# Patient Record
Sex: MALE | Race: WHITE | NOT HISPANIC OR LATINO | URBAN - METROPOLITAN AREA
[De-identification: names, ages, dates, MRNs, and addresses within clinical notes are randomized per-mention and may not be internally consistent; named-entity substitution may affect disease eponyms.]

---

## 2019-05-01 ENCOUNTER — HISTORICAL (OUTPATIENT)
Dept: ADMINISTRATIVE | Facility: HOSPITAL | Age: 19
End: 2019-05-01

## 2019-05-01 LAB
ABS NEUT (OLG): 5.1 X10(3)/MCL (ref 2.1–9.2)
ALBUMIN SERPL-MCNC: 4.5 GM/DL (ref 3.4–5)
ALBUMIN/GLOB SERPL: 1.3 RATIO (ref 1.1–2)
ALP SERPL-CCNC: 67 UNIT/L (ref 45–117)
ALT SERPL-CCNC: 48 UNIT/L (ref 12–78)
AST SERPL-CCNC: 17 UNIT/L (ref 15–37)
BASOPHILS # BLD AUTO: 0.09 X10(3)/MCL
BASOPHILS NFR BLD AUTO: 1 %
BILIRUB SERPL-MCNC: 0.4 MG/DL (ref 0.2–1)
BILIRUBIN DIRECT+TOT PNL SERPL-MCNC: 0.2 MG/DL
BILIRUBIN DIRECT+TOT PNL SERPL-MCNC: 0.2 MG/DL
BUN SERPL-MCNC: 17 MG/DL (ref 7–18)
CALCIUM SERPL-MCNC: 10 MG/DL (ref 8.5–10.1)
CHLORIDE SERPL-SCNC: 107 MMOL/L (ref 98–107)
CO2 SERPL-SCNC: 30 MMOL/L (ref 21–32)
CREAT SERPL-MCNC: 1 MG/DL (ref 0.6–1.3)
EOSINOPHIL # BLD AUTO: 0.85 X10(3)/MCL
EOSINOPHIL NFR BLD AUTO: 7 %
ERYTHROCYTE [DISTWIDTH] IN BLOOD BY AUTOMATED COUNT: 13.2 % (ref 11.5–14.5)
GLOBULIN SER-MCNC: 3.5 GM/ML (ref 2.3–3.5)
GLUCOSE SERPL-MCNC: 71 MG/DL (ref 74–106)
HCT VFR BLD AUTO: 47.5 % (ref 40–51)
HGB BLD-MCNC: 16.1 GM/DL (ref 13.5–17.5)
IMM GRANULOCYTES # BLD AUTO: 0.03 10*3/UL
IMM GRANULOCYTES NFR BLD AUTO: 0 %
LYMPHOCYTES # BLD AUTO: 4.55 X10(3)/MCL
LYMPHOCYTES NFR BLD AUTO: 40 % (ref 13–40)
MCH RBC QN AUTO: 30.6 PG (ref 26–34)
MCHC RBC AUTO-ENTMCNC: 33.9 GM/DL (ref 31–37)
MCV RBC AUTO: 90.1 FL (ref 80–100)
MONOCYTES # BLD AUTO: 0.79 X10(3)/MCL
MONOCYTES NFR BLD AUTO: 7 % (ref 0–10)
NEUTROPHILS # BLD AUTO: 5.1 X10(3)/MCL
NEUTROPHILS NFR BLD AUTO: 45 X10(3)/MCL
PLATELET # BLD AUTO: 287 X10(3)/MCL (ref 130–400)
PMV BLD AUTO: 10.4 FL (ref 7.4–10.4)
POTASSIUM SERPL-SCNC: 4.2 MMOL/L (ref 3.5–5.1)
PROT SERPL-MCNC: 8 GM/DL (ref 6.4–8.2)
RBC # BLD AUTO: 5.27 X10(6)/MCL (ref 4.5–5.9)
SODIUM SERPL-SCNC: 140 MMOL/L (ref 136–145)
WBC # SPEC AUTO: 11.4 X10(3)/MCL (ref 4.5–11)

## 2019-05-14 ENCOUNTER — HISTORICAL (OUTPATIENT)
Dept: RADIOLOGY | Facility: HOSPITAL | Age: 19
End: 2019-05-14

## 2019-09-03 ENCOUNTER — HISTORICAL (OUTPATIENT)
Dept: FAMILY MEDICINE | Facility: CLINIC | Age: 19
End: 2019-09-03

## 2019-09-03 LAB
APPEARANCE, UA: CLEAR
BACTERIA #/AREA URNS AUTO: ABNORMAL /[HPF]
BILIRUB UR QL STRIP: NEGATIVE
COLOR UR: YELLOW
CRP SERPL-MCNC: <0.3 MG/DL
ERYTHROCYTE [DISTWIDTH] IN BLOOD BY AUTOMATED COUNT: 12.7 % (ref 11.5–14.5)
GLUCOSE (UA): NORMAL
HCT VFR BLD AUTO: 52.8 % (ref 40–51)
HGB BLD-MCNC: 17.6 GM/DL (ref 13.5–17.5)
HGB UR QL STRIP: NEGATIVE
HIV 1+2 AB+HIV1 P24 AG SERPL QL IA: NONREACTIVE
HYALINE CASTS #/AREA URNS LPF: ABNORMAL /[LPF]
KETONES UR QL STRIP: NEGATIVE
LEUKOCYTE ESTERASE UR QL STRIP: NEGATIVE
MCH RBC QN AUTO: 30.4 PG (ref 26–34)
MCHC RBC AUTO-ENTMCNC: 33.3 GM/DL (ref 31–37)
MCV RBC AUTO: 91.2 FL (ref 80–100)
NITRITE UR QL STRIP: NEGATIVE
PH UR STRIP: 6 [PH] (ref 4.5–8)
PLATELET # BLD AUTO: 292 X10(3)/MCL (ref 130–400)
PMV BLD AUTO: 9.4 FL (ref 7.4–10.4)
PROT UR QL STRIP: 30 MG/DL
RBC # BLD AUTO: 5.79 X10(6)/MCL (ref 4.5–5.9)
RBC #/AREA URNS AUTO: ABNORMAL /[HPF]
SP GR UR STRIP: 1.04 (ref 1–1.03)
SQUAMOUS #/AREA URNS LPF: ABNORMAL /[LPF]
TSH SERPL-ACNC: 1.37 MIU/L (ref 0.36–3.74)
UROBILINOGEN UR STRIP-ACNC: 2 MG/DL
WBC # SPEC AUTO: 10 X10(3)/MCL (ref 4.5–11)
WBC #/AREA URNS AUTO: ABNORMAL /HPF

## 2022-04-10 ENCOUNTER — HISTORICAL (OUTPATIENT)
Dept: ADMINISTRATIVE | Facility: HOSPITAL | Age: 22
End: 2022-04-10

## 2022-04-30 VITALS
OXYGEN SATURATION: 100 % | HEIGHT: 64 IN | WEIGHT: 173.31 LBS | SYSTOLIC BLOOD PRESSURE: 120 MMHG | DIASTOLIC BLOOD PRESSURE: 68 MMHG | BODY MASS INDEX: 29.59 KG/M2

## 2022-05-30 ENCOUNTER — TELEPHONE (OUTPATIENT)
Dept: FAMILY MEDICINE | Facility: CLINIC | Age: 22
End: 2022-05-30

## 2022-05-30 NOTE — TELEPHONE ENCOUNTER
90L completed to the best of my ability considering I have never eval'd the patient face to face (I.e. PE not fully completed). Placed in tower for scan and fax.    Thanks,  ROMAN Murphy MD      ----- Message from Perla Fofana sent at 5/27/2022 11:20 AM CDT -----  Paulette good morning [facility] patient 90-L form that was scanned into the chart 5-. Needs to be filled out ASAP.

## 2022-10-27 ENCOUNTER — OFFICE VISIT (OUTPATIENT)
Dept: FAMILY MEDICINE | Facility: CLINIC | Age: 22
End: 2022-10-27
Payer: MEDICAID

## 2022-10-27 VITALS
TEMPERATURE: 98 F | BODY MASS INDEX: 33.29 KG/M2 | HEART RATE: 60 BPM | HEIGHT: 64 IN | WEIGHT: 195 LBS | RESPIRATION RATE: 17 BRPM | OXYGEN SATURATION: 100 % | SYSTOLIC BLOOD PRESSURE: 122 MMHG | DIASTOLIC BLOOD PRESSURE: 72 MMHG

## 2022-10-27 DIAGNOSIS — F91.3 OPPOSITIONAL DEFIANT DISORDER: ICD-10-CM

## 2022-10-27 DIAGNOSIS — K21.9 GASTROESOPHAGEAL REFLUX DISEASE, UNSPECIFIED WHETHER ESOPHAGITIS PRESENT: ICD-10-CM

## 2022-10-27 DIAGNOSIS — R44.3 HALLUCINATIONS: ICD-10-CM

## 2022-10-27 DIAGNOSIS — F84.0 AUTISM: ICD-10-CM

## 2022-10-27 LAB
ALBUMIN SERPL-MCNC: 4.7 GM/DL (ref 3.5–5)
ALBUMIN/GLOB SERPL: 1.6 RATIO (ref 1.1–2)
ALP SERPL-CCNC: 58 UNIT/L (ref 40–150)
ALT SERPL-CCNC: 64 UNIT/L (ref 0–55)
AST SERPL-CCNC: 24 UNIT/L (ref 5–34)
BASOPHILS # BLD AUTO: 0.07 X10(3)/MCL (ref 0–0.2)
BASOPHILS NFR BLD AUTO: 0.7 %
BILIRUBIN DIRECT+TOT PNL SERPL-MCNC: 0.7 MG/DL
BUN SERPL-MCNC: 15.4 MG/DL (ref 8.9–20.6)
CALCIUM SERPL-MCNC: 10.2 MG/DL (ref 8.4–10.2)
CHLORIDE SERPL-SCNC: 101 MMOL/L (ref 98–107)
CHOLEST SERPL-MCNC: 187 MG/DL
CHOLEST/HDLC SERPL: 6 {RATIO} (ref 0–5)
CO2 SERPL-SCNC: 26 MMOL/L (ref 22–29)
CREAT SERPL-MCNC: 1.02 MG/DL (ref 0.73–1.18)
EOSINOPHIL # BLD AUTO: 0.49 X10(3)/MCL (ref 0–0.9)
EOSINOPHIL NFR BLD AUTO: 5.1 %
ERYTHROCYTE [DISTWIDTH] IN BLOOD BY AUTOMATED COUNT: 13.2 % (ref 11.5–17)
EST. AVERAGE GLUCOSE BLD GHB EST-MCNC: 99.7 MG/DL
GFR SERPLBLD CREATININE-BSD FMLA CKD-EPI: >60 MLS/MIN/1.73/M2
GLOBULIN SER-MCNC: 3 GM/DL (ref 2.4–3.5)
GLUCOSE SERPL-MCNC: 67 MG/DL (ref 74–100)
HBA1C MFR BLD: 5.1 %
HCT VFR BLD AUTO: 50.5 % (ref 42–52)
HDLC SERPL-MCNC: 32 MG/DL (ref 35–60)
HGB BLD-MCNC: 16.6 GM/DL (ref 14–18)
IMM GRANULOCYTES # BLD AUTO: 0.02 X10(3)/MCL (ref 0–0.04)
IMM GRANULOCYTES NFR BLD AUTO: 0.2 %
LDLC SERPL CALC-MCNC: 108 MG/DL (ref 50–140)
LYMPHOCYTES # BLD AUTO: 3.17 X10(3)/MCL (ref 0.6–4.6)
LYMPHOCYTES NFR BLD AUTO: 33.1 %
MCH RBC QN AUTO: 29.9 PG (ref 27–31)
MCHC RBC AUTO-ENTMCNC: 32.9 MG/DL (ref 33–36)
MCV RBC AUTO: 90.8 FL (ref 80–94)
MONOCYTES # BLD AUTO: 0.57 X10(3)/MCL (ref 0.1–1.3)
MONOCYTES NFR BLD AUTO: 5.9 %
NEUTROPHILS # BLD AUTO: 5.3 X10(3)/MCL (ref 2.1–9.2)
NEUTROPHILS NFR BLD AUTO: 55 %
NRBC BLD AUTO-RTO: 0 %
PLATELET # BLD AUTO: 327 X10(3)/MCL (ref 130–400)
PMV BLD AUTO: 10 FL (ref 7.4–10.4)
POTASSIUM SERPL-SCNC: 4.3 MMOL/L (ref 3.5–5.1)
PROT SERPL-MCNC: 7.7 GM/DL (ref 6.4–8.3)
RBC # BLD AUTO: 5.56 X10(6)/MCL (ref 4.7–6.1)
SODIUM SERPL-SCNC: 139 MMOL/L (ref 136–145)
TRIGL SERPL-MCNC: 235 MG/DL (ref 34–140)
TSH SERPL-ACNC: 1.19 UIU/ML (ref 0.35–4.94)
VLDLC SERPL CALC-MCNC: 47 MG/DL
WBC # SPEC AUTO: 9.6 X10(3)/MCL (ref 4.5–11.5)

## 2022-10-27 PROCEDURE — 80061 LIPID PANEL: CPT

## 2022-10-27 PROCEDURE — 36415 COLL VENOUS BLD VENIPUNCTURE: CPT

## 2022-10-27 PROCEDURE — 85025 COMPLETE CBC W/AUTO DIFF WBC: CPT

## 2022-10-27 PROCEDURE — 80053 COMPREHEN METABOLIC PANEL: CPT

## 2022-10-27 PROCEDURE — 83036 HEMOGLOBIN GLYCOSYLATED A1C: CPT

## 2022-10-27 PROCEDURE — 84443 ASSAY THYROID STIM HORMONE: CPT

## 2022-10-27 PROCEDURE — 99213 OFFICE O/P EST LOW 20 MIN: CPT | Mod: PBBFAC
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
## 2022-11-08 ENCOUNTER — CLINICAL SUPPORT (OUTPATIENT)
Dept: FAMILY MEDICINE | Facility: CLINIC | Age: 22
End: 2022-11-08
Payer: MEDICAID

## 2022-11-08 DIAGNOSIS — R44.3 HALLUCINATIONS: Primary | ICD-10-CM

## 2022-11-08 NOTE — PROGRESS NOTES
Chief Complaint  Hallucinations (Daily and worsening) and Appointment (Possible virtual visit with accomodation for initial visit.)      The patient location is: [place]       Visit type: audiovisual    Face to Face time with patient: 10 mins  30 minutes of total time spent on the encounter, which includes face to face time and non-face to face time preparing to see the patient (eg, review of tests), Obtaining and/or reviewing separately obtained history, Documenting clinical information in the electronic or other health record, Independently interpreting results (not separately reported) and communicating results to the patient/family/caregiver, or Care coordination (not separately reported).         Each patient to whom he or she provides medical services by telemedicine is:  (1) informed of the relationship between the physician and patient and the respective role of any other health care provider with respect to management of the patient; and (2) notified that he or she may decline to receive medical services by telemedicine and may withdraw from such care at any time.    History of Present Illness  Rod Siddiqui Jr. is a 22 y.o. year old male with PMH of Autism (non-verbal), ODD, GERD presents to the clinic for hallucinations f/u; last seen 10/27/2022 labs drawn to r/o organic causes and referral to psych    Mother, Alicia Sanders, reports continued visual, auditory and tactile hallucinations. Occurs several times daily. Patient becoming more agitated. 2 days ago, patient saw a snake wrapped around his throat.Becomes agitated mother tries redirect Denies hallucinations telling him to harm himself or others. Mother doesn't fele unsafe or threatened, no history of physical abuse. No appt from Dr. Tiwari office yet. Mother inquiring if it's possible make a telehealth appointment with [facility] in the interim.      Review of Systems  ROS-as noted in HPI      Current Outpatient Medications  No current  outpatient medications on file as of 11/8/2022.     No current facility-administered medications on file as of 11/8/2022.         Assessment / Plan:    Hallucinations  Labs reviewed and discussed with Mother, no organic causes found  No SI/HI, no history of aggression or violence  Awaiting appointment from Dr. Prater's clinic. Will reach out to office to inquire on status of referral  ED precautions provided      Follow up:    In 4 weeks, or earlier if needed. Patient requires televisits only    Shira Mccrary MD  [employer] [facility] PGY-2

## 2022-11-09 NOTE — PROGRESS NOTES
Faculty Attestation: This is a telehealth visit that was performed with the originating site at patient's home in [place], [place] and the distant site through [facility] in [place], [place]. Verbal consent to participate in the video visit was obtained. This particular visit occurred during the 2020 COVID19 outbreak. The resident discussed with the patient the nature of this telehealth visit, that: there is an evaluation of the patient and recommendations for diagnostics and treatments based on this assessment; our sessions are not being recorded and that personal health information is protected as much as possible; our team would provide follow up care in person if/when the patient needs it. Rod Sididqui Jr.  was last seen in [facility]. Discussed with resident at the time of the visit. History of Present Illness, no physical exam, and Assessment and Plan reviewed.  Treatment plan is reasonable and appropriate. Compliance with treatment recommendations is important. No imaging studies were done today. - Tomas Huynh MD

## 2022-12-05 ENCOUNTER — PATIENT MESSAGE (OUTPATIENT)
Dept: BEHAVIORAL HEALTH | Facility: CLINIC | Age: 22
End: 2022-12-05
Payer: MEDICAID

## 2022-12-09 ENCOUNTER — TELEPHONE (OUTPATIENT)
Dept: FAMILY MEDICINE | Facility: CLINIC | Age: 22
End: 2022-12-09
Payer: MEDICAID

## 2022-12-09 NOTE — TELEPHONE ENCOUNTER
[facility]. Patient is nonverbal and his mother, Elayne, is his caregiver. GEETHAW spoke to Elayne on 12/9/22 regarding her concerns.    Patient was referred to Dr. Prater and has an appointment pending in January 2023, however, family has an alternate appointment scheduled at [facility] before the appointment with Dr. Prater. The patient's mother knows one of the psychiatric providers there and is requesting to establish psychiatric care with [facility] at this time. [facility] is requesting a referral with the most recent office note, labs, and facesheet. [facility]     LCSW will follow up with family next week. Elayne does not want to cancel the appointment with Dr. Prater until the appointment/referral with [facility] is secured. GEETHAW will follow up.   -----------------------  12/12/22- [facility] receipt of [facility] and clinicals. No further documentation requested at this time. [facility]'s mother, Elayne, to advise but there was no answer. LCSW left a VM encouraging Elayne to call with further unmet needs or concerns. LCSW will be available as needed.

## 2023-03-10 ENCOUNTER — TELEPHONE (OUTPATIENT)
Dept: FAMILY MEDICINE | Facility: CLINIC | Age: 23
End: 2023-03-10
Payer: MEDICAID

## 2023-03-24 ENCOUNTER — CLINICAL SUPPORT (OUTPATIENT)
Dept: FAMILY MEDICINE | Facility: CLINIC | Age: 23
End: 2023-03-24
Payer: MEDICAID

## 2023-03-24 DIAGNOSIS — F84.0 AUTISM: Primary | ICD-10-CM

## 2023-03-24 DIAGNOSIS — R44.3 HALLUCINATIONS: ICD-10-CM

## 2023-03-24 RX ORDER — OLANZAPINE 5 MG/1
TABLET ORAL
COMMUNITY
Start: 2023-02-07 | End: 2023-05-25

## 2023-03-24 RX ORDER — OLANZAPINE 5 MG/1
5 TABLET ORAL NIGHTLY
COMMUNITY
Start: 2023-03-14 | End: 2023-05-25

## 2023-03-24 RX ORDER — OXCARBAZEPINE 300 MG/1
300 TABLET, FILM COATED ORAL 2 TIMES DAILY
COMMUNITY
Start: 2023-03-14 | End: 2023-07-06 | Stop reason: SDUPTHER

## 2023-03-24 RX ORDER — ALPRAZOLAM 0.5 MG/1
0.5 TABLET ORAL 2 TIMES DAILY PRN
COMMUNITY
Start: 2023-03-14 | End: 2024-03-27 | Stop reason: ALTCHOICE

## 2023-03-24 NOTE — PROGRESS NOTES
Faculty addendum: Patient discussed with resident.  Care provided reasonable and necessary. I participated in the management of the patient and was immediately available throughout the encounter. Services were furnished in a primary care center located in the outpatient department of a [facility]. I agree with the resident's findings and plan as documented in the resident's note.     Tele visit with mom, patient non verbal autistic patient. Recent medication changes with psychiatric NP, has appointment with Dr. Prater in May 2023. 16min total time.    Lona Harris DO

## 2023-03-24 NOTE — PROGRESS NOTES
Chief Complaint  Hallucinations (Follow up )      Audio Only Telehealth Visit     The patient location is: [place]  Visit type: Virtual visit with audio only (telephone)  Total time spent with patient: 16mins     The reason for the audio only service rather than synchronous audio and video virtual visit was related to technical difficulties or patient preference/necessity.     Each patient to whom I provide medical services by telemedicine is:  (1) informed of the relationship between the physician and patient and the respective role of any other health care provider with respect to management of the patient; and (2) notified that they may decline to receive medical services by telemedicine and may withdraw from such care at any time. Patient caregiver verbally consented to receive this service via voice-only telephone call.      History of Present Illness  Rod Siddiqui Jr. is a 22-year-old male with a past medical history of autism (nonverbal), ODT, GERD, hallucinations; Mother scheduled a telemed visit for hallucinations follow up; last visit 11/08/2022     Patient is nonverbal and mother/caretaker, Alicia Sanders, presents for follow up on behalf on patient.    Patient was unable to get an earlier appointment with Dr. Prater, in interm was scheduled to be seen by [facility].   Provider was Cole Meza NP last seen 3/14/2023, next visit pending   5/25/2023  Initially started Risperdal 1mg qam Risperdal 0.5mg qhs with Zyprexa 2.5mg qhs.  Symptoms persisted, then transitioned to Zyprexa 5mg bid.  Prescribed Xanax and Trileptal, mother not given due to concern of polypharmacy.  Mother pleased with results and reports symptoms are much better controlled.  Rod is less agitation, not overly sedated, no longer crying daily or appears to be in mental distress.  Continues to have auditory/tactile/visual hallucinations 1-2 a day; previously consistently 24hr/day  No self harm, SI/HI   Increased weight  gain, mother unable to quantify      ROS per HPI      Current Outpatient Medications  Current Outpatient Medications   Medication Instructions    ALPRAZolam (XANAX) 0.5 mg, Oral, 2 times daily PRN    OLANZapine (ZYPREXA) 5 MG tablet 1.5 tablest    OLANZapine (ZYPREXA) 5 mg, Oral, Nightly    OXcarbazepine (TRILEPTAL) 300 mg, Oral, 2 times daily             Assessment / Plan:    Autism-nonverbal  Hallucinations    Concern for schizophrenia  Psychiatric evaluation pending, scheduled for 05/25/2023  Keep appointment with Dr. Prater for complete assessment and medication reconciliation.  Keep follow up with mental health provider  Cautioned on side effects of Zyprexa  Recommended dietary modification and exercise  Discussed with mental health provider on surveillance labs to include but not limited to lipid panel.  ED precautions given    Start time: 1105  End time: 1121    This service was not originating from a related E/M service provided within the previous 7 days nor will  to an E/M service or procedure within the next 24 hours or my soonest available appointment.  Prevailing standard of care was able to be met in this audio-only visit.      Follow up:    In 3 months, or earlier if needed.     Shira Mccrary MD  [employer] [facility] PGY-2

## 2023-05-25 ENCOUNTER — OFFICE VISIT (OUTPATIENT)
Dept: BEHAVIORAL HEALTH | Facility: CLINIC | Age: 23
End: 2023-05-25
Payer: MEDICAID

## 2023-05-25 VITALS
HEART RATE: 62 BPM | TEMPERATURE: 98 F | BODY MASS INDEX: 35.06 KG/M2 | DIASTOLIC BLOOD PRESSURE: 74 MMHG | SYSTOLIC BLOOD PRESSURE: 124 MMHG | OXYGEN SATURATION: 100 % | WEIGHT: 204.13 LBS

## 2023-05-25 DIAGNOSIS — F29 PSYCHOSIS, UNSPECIFIED PSYCHOSIS TYPE: Primary | ICD-10-CM

## 2023-05-25 PROCEDURE — 1159F MED LIST DOCD IN RCRD: CPT | Mod: CPTII,,, | Performed by: STUDENT IN AN ORGANIZED HEALTH CARE EDUCATION/TRAINING PROGRAM

## 2023-05-25 PROCEDURE — 3074F SYST BP LT 130 MM HG: CPT | Mod: CPTII,,, | Performed by: STUDENT IN AN ORGANIZED HEALTH CARE EDUCATION/TRAINING PROGRAM

## 2023-05-25 PROCEDURE — 3078F PR MOST RECENT DIASTOLIC BLOOD PRESSURE < 80 MM HG: ICD-10-PCS | Mod: CPTII,,, | Performed by: STUDENT IN AN ORGANIZED HEALTH CARE EDUCATION/TRAINING PROGRAM

## 2023-05-25 PROCEDURE — 3008F BODY MASS INDEX DOCD: CPT | Mod: CPTII,,, | Performed by: STUDENT IN AN ORGANIZED HEALTH CARE EDUCATION/TRAINING PROGRAM

## 2023-05-25 PROCEDURE — 3008F PR BODY MASS INDEX (BMI) DOCUMENTED: ICD-10-PCS | Mod: CPTII,,, | Performed by: STUDENT IN AN ORGANIZED HEALTH CARE EDUCATION/TRAINING PROGRAM

## 2023-05-25 PROCEDURE — 99204 OFFICE O/P NEW MOD 45 MIN: CPT | Mod: AF,HB,S$PBB, | Performed by: STUDENT IN AN ORGANIZED HEALTH CARE EDUCATION/TRAINING PROGRAM

## 2023-05-25 PROCEDURE — 3078F DIAST BP <80 MM HG: CPT | Mod: CPTII,,, | Performed by: STUDENT IN AN ORGANIZED HEALTH CARE EDUCATION/TRAINING PROGRAM

## 2023-05-25 PROCEDURE — 99213 OFFICE O/P EST LOW 20 MIN: CPT | Mod: PBBFAC,PN | Performed by: STUDENT IN AN ORGANIZED HEALTH CARE EDUCATION/TRAINING PROGRAM

## 2023-05-25 PROCEDURE — 1159F PR MEDICATION LIST DOCUMENTED IN MEDICAL RECORD: ICD-10-PCS | Mod: CPTII,,, | Performed by: STUDENT IN AN ORGANIZED HEALTH CARE EDUCATION/TRAINING PROGRAM

## 2023-05-25 PROCEDURE — 3074F PR MOST RECENT SYSTOLIC BLOOD PRESSURE < 130 MM HG: ICD-10-PCS | Mod: CPTII,,, | Performed by: STUDENT IN AN ORGANIZED HEALTH CARE EDUCATION/TRAINING PROGRAM

## 2023-05-25 PROCEDURE — 99204 PR OFFICE/OUTPT VISIT, NEW, LEVL IV, 45-59 MIN: ICD-10-PCS | Mod: AF,HB,S$PBB, | Performed by: STUDENT IN AN ORGANIZED HEALTH CARE EDUCATION/TRAINING PROGRAM

## 2023-05-25 PROCEDURE — 1160F RVW MEDS BY RX/DR IN RCRD: CPT | Mod: CPTII,,, | Performed by: STUDENT IN AN ORGANIZED HEALTH CARE EDUCATION/TRAINING PROGRAM

## 2023-05-25 PROCEDURE — 1160F PR REVIEW ALL MEDS BY PRESCRIBER/CLIN PHARMACIST DOCUMENTED: ICD-10-PCS | Mod: CPTII,,, | Performed by: STUDENT IN AN ORGANIZED HEALTH CARE EDUCATION/TRAINING PROGRAM

## 2023-05-25 RX ORDER — OLANZAPINE AND SAMIDORPHAN L-MALATE 15; 10 MG/1; MG/1
15 TABLET, FILM COATED ORAL DAILY
COMMUNITY
End: 2023-05-30

## 2023-05-25 RX ORDER — OLANZAPINE 10 MG/1
10 TABLET ORAL NIGHTLY
COMMUNITY
Start: 2023-04-24 | End: 2023-05-25 | Stop reason: DRUGHIGH

## 2023-05-25 RX ORDER — ARIPIPRAZOLE 5 MG/1
15 TABLET ORAL DAILY
Qty: 30 TABLET | Refills: 2 | Status: SHIPPED | OUTPATIENT
Start: 2023-05-25 | End: 2023-06-08 | Stop reason: ALTCHOICE

## 2023-05-25 RX ORDER — OLANZAPINE 5 MG/1
TABLET ORAL
Qty: 90 TABLET | Refills: 5 | Status: SHIPPED | OUTPATIENT
Start: 2023-05-25 | End: 2023-07-06 | Stop reason: SDUPTHER

## 2023-05-25 NOTE — PROGRESS NOTES
"Outpatient Psychiatry Initial Visit    5/25/2023    Rod Siddiqui Jr., a 22 y.o. male, presenting for initial evaluation visit. Met with patient.    Reason for Encounter:   Referred from: Buddy Martin MD  Reason for referral: "Autism," "hallucinations"  Chief complaint: hallucinations, behavioral problem x 6 months    History of Present Illness:   Pt is a 21yo M w/ PPHx of autism  who presents to psychiatry clinic for evaluation.  Pt mostly nonverbal, history provided by pt's mother.     Pt notes history of mental health treatment, last seen about 1-2 wks ago.  Was seeing [facility] in [place], [place] (Kate Meza, NP).  Transferring care due to travel issues, and lack of contact with MH provider.  Pt has been in mental health for about 6 months.  Notes onset of hallucinations (AH and VH) about 6 months ago.  Mother initially attributes to due to watching particular videos, notes changing device settings to children's settings.  Met with provider, diagnosed with schizophrenia.  Has been tried on zyprexa (somewhat helpful, still have episodes), risperidone (no benefit) and geodon (no benefit).  Now taking zyprexa 10mg qam + 15mg nightly.  Recently changed to zyprexa+ samidorphan.  Also taking trileptal, notes benefit for irritability.  Current problem is daytime oversedation.  Also diagnosed with ODD and autism (diagnosed with autism).  Currently receiving OCDD services (has , sitter 6am-4pm M-F).      Notes hallucinations are seeing and hearing male and female people.  Notes VH are of people "touching" patient and kissing patient on face and neck.  No history of abuse reported.  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  Pt mostly nonverbal, gestures to indicate his thoughts/emotions.  Graduated from school with special ED, had IEP.  Spends most of time with family, has few friends.  Notes, prior to outbursts, says his name repeatedly.  Denies self injury when stressed.  "     Meds Hx (has pt taken the following):   SSRIs: denies  SNRIs: denies  TCAs: denies  Atypical ADs: denies  Anxiolytics: xanax (helpful, SE sedation)  Neuroleptics: geodon (not helpful, no SE), risperidone (not helpful), zyprexa (some benefit, SE sedation and increased appetite)  Mood stabilizers: denies  Stimulants: denies  Other: trileptal (helpful, no SE)    History:     Allergies:  Augmentin [amoxicillin-pot clavulanate]    Past Medical/Surgical History:  Past Medical History:   Diagnosis Date    Autism 10/27/2022    GERD (gastroesophageal reflux disease) 10/27/2022    Oppositional defiant disorder 10/27/2022     History reviewed. No pertinent surgical history.    Medications  Outpatient Encounter Medications as of 5/25/2023   Medication Sig Dispense Refill    ALPRAZolam (XANAX) 0.5 MG tablet Take 0.5 mg by mouth 2 (two) times daily as needed.      OLANZapine-samidorphan (LYBALVI) 15-10 mg Tab Take 15 mg by mouth once daily.      OXcarbazepine (TRILEPTAL) 300 MG Tab Take 300 mg by mouth 2 (two) times daily.      [DISCONTINUED] OLANZapine (ZYPREXA) 10 MG tablet Take 10 mg by mouth every evening.      [DISCONTINUED] OLANZapine (ZYPREXA) 5 MG tablet 1.5 tablest      ARIPiprazole (ABILIFY) 5 MG Tab Take 3 tablets (15 mg total) by mouth once daily. 30 tablet 2    OLANZapine (ZYPREXA) 5 MG tablet Take olanzapine 5mg daily and 10mg nightly. 90 tablet 5    [DISCONTINUED] OLANZapine (ZYPREXA) 5 MG tablet Take 5 mg by mouth every evening.       No facility-administered encounter medications on file as of 5/25/2023.     Past Psychiatric History:  Previous Medication Trials: See above   Previous Psychiatric Hospitalizations: denies   Previous Suicide Attempts: denies   History of Violence: None in past 6 months  Outpatient mental health: seeing MH provider in [place]  Family History: denies    Social History:  Marital Status: denies  Children:    Employment Status/Info: not working  Education: HS grad, had  IEP  Housing Status: lives in [place] [place] with mother, brother, sister, grandchild, and sister's partner  History of phys/sexual abuse: denies  Access to gun: yes, in locked/secure location, pt has no access    Substance Abuse History:  Tobacco Use: 1ppd, started 15yo  Use of Alcohol: denies  Recreational Drugs: denies  Rehab/detox: denies    Legal History:  Past Charges/Incarcerations: denies   Pending charges: denies     Psychosocial Stressors: health    Review Of Systems:     OSCAR 2/2 current mentation    Current Evaluation:     Nutritional Screening: Considering the patient's height and weight, medications, medical history and preferences, should a referral be made to the dietitian? no    Constitutional  Vitals:  Most recent vital signs, dated less than 90 days prior to this appointment, were reviewed.      Vitals:    05/25/23 0925   BP: 124/74   Pulse: 62   Temp: 98 °F (36.7 °C)   SpO2: 100%   Weight: 92.6 kg (204 lb 1.6 oz)      General:  No acute distress     Neurologic:   Motor: moves all extremities spontaneously and without difficulty  Gait: normal gait and station    Mental status examination:  Appearance: unremarkable, age appropriate, bearded, overweight  Level of Consciousness: awake and alert  Behavior/Cooperation:  pacing about room, +echopraxia  Psychomotor: psychomotor agitation (pacing)  Speech: dysarthia, loud, monotone  Language: english, fluid  Orientation: OSCAR  Attention Span/Concentration: does not attend to interview  Memory: [facility]  Mood: [facility]  Affect: mood congruent and blunted  Thought Process: OSCAR  Associations: [facility]  Thought Content: OSCAR  Fund of Knowledge: [facility]  Abstraction: [facility]  Insight: [facility]  Judgment: [facility]    Relevant Elements of Neurological Exam: no abnormal involuntary movements observed    Functioning in Relationships:  Spouse/partner: not in dating relationship  Peers: few friends  Employers: not working currently    Assessments:   PHQ9:   PHQ9 5/25/2023   Total Score 8     GAD7:   GAD7  5/25/2023   1. Feeling nervous, anxious, or on edge? 3   2. Not being able to stop or control worrying? 0   3. Worrying too much about different things? 0   4. Trouble relaxing? 1   5. Being so restless that it is hard to sit still? 2   6. Becoming easily annoyed or irritable? 2   7. Feeling afraid as if something awful might happen? 2   8. If you checked off any problems, how difficult have these problems made it for you to do your work, take care of things at home, or get along with other people? 1   OLVIN-7 Score 10     Laboratory Data  No visits with results within 1 Month(s) from this visit.   Latest known visit with results is:   Office Visit on 10/27/2022   Component Date Value Ref Range Status    Hemoglobin A1c 10/27/2022 5.1  <=7.0 % Final    Estimated Average Glucose 10/27/2022 99.7  mg/dL Final    Cholesterol Total 10/27/2022 187  <=200 mg/dL Final    HDL Cholesterol 10/27/2022 32 (L)  35 - 60 mg/dL Final    Triglyceride 10/27/2022 235 (H)  34 - 140 mg/dL Final    Cholesterol/HDL Ratio 10/27/2022 6 (H)  0 - 5 Final    Very Low Density Lipoprotein 10/27/2022 47   Final    LDL Cholesterol 10/27/2022 108.00  50.00 - 140.00 mg/dL Final    Sodium Level 10/27/2022 139  136 - 145 mmol/L Final    Potassium Level 10/27/2022 4.3  3.5 - 5.1 mmol/L Final    Chloride 10/27/2022 101  98 - 107 mmol/L Final    Carbon Dioxide 10/27/2022 26  22 - 29 mmol/L Final    Glucose Level 10/27/2022 67 (L)  74 - 100 mg/dL Final    Blood Urea Nitrogen 10/27/2022 15.4  8.9 - 20.6 mg/dL Final    Creatinine 10/27/2022 1.02  0.73 - 1.18 mg/dL Final    Calcium Level Total 10/27/2022 10.2  8.4 - 10.2 mg/dL Final    Protein Total 10/27/2022 7.7  6.4 - 8.3 gm/dL Final    Albumin Level 10/27/2022 4.7  3.5 - 5.0 gm/dL Final    Globulin 10/27/2022 3.0  2.4 - 3.5 gm/dL Final    Albumin/Globulin Ratio 10/27/2022 1.6  1.1 - 2.0 ratio Final    Bilirubin Total 10/27/2022 0.7  <=1.5 mg/dL Final    Alkaline Phosphatase 10/27/2022 58  40 - 150 unit/L  Final    Alanine Aminotransferase 10/27/2022 64 (H)  0 - 55 unit/L Final    Aspartate Aminotransferase 10/27/2022 24  5 - 34 unit/L Final    eGFR 10/27/2022 >60  mls/min/1.73/m2 Final    Thyroid Stimulating Hormone 10/27/2022 1.1858  0.3500 - 4.9400 uIU/mL Final    WBC 10/27/2022 9.6  4.5 - 11.5 x10(3)/mcL Final    RBC 10/27/2022 5.56  4.70 - 6.10 x10(6)/mcL Final    Hgb 10/27/2022 16.6  14.0 - 18.0 gm/dL Final    Hct 10/27/2022 50.5  42.0 - 52.0 % Final    MCV 10/27/2022 90.8  80.0 - 94.0 fL Final    MCH 10/27/2022 29.9  27.0 - 31.0 pg Final    MCHC 10/27/2022 32.9 (L)  33.0 - 36.0 mg/dL Final    RDW 10/27/2022 13.2  11.5 - 17.0 % Final    Platelet 10/27/2022 327  130 - 400 x10(3)/mcL Final    MPV 10/27/2022 10.0  7.4 - 10.4 fL Final    Neut % 10/27/2022 55.0  % Final    Lymph % 10/27/2022 33.1  % Final    Mono % 10/27/2022 5.9  % Final    Eos % 10/27/2022 5.1  % Final    Basophil % 10/27/2022 0.7  % Final    Lymph # 10/27/2022 3.17  0.6 - 4.6 x10(3)/mcL Final    Neut # 10/27/2022 5.3  2.1 - 9.2 x10(3)/mcL Final    Mono # 10/27/2022 0.57  0.1 - 1.3 x10(3)/mcL Final    Eos # 10/27/2022 0.49  0 - 0.9 x10(3)/mcL Final    Baso # 10/27/2022 0.07  0 - 0.2 x10(3)/mcL Final    IG# 10/27/2022 0.02  0 - 0.04 x10(3)/mcL Final    IG% 10/27/2022 0.2  % Final    NRBC% 10/27/2022 0.0  % Final       Assessment - Diagnosis - Goals:     Rod Siddiqui Jr., a 22 y.o. male, presenting for initial evaluation visit.     Impression:       ICD-10-CM ICD-9-CM   1. Psychosis, unspecified psychosis type  F29 298.9       Strengths and Liabilities: Strength: Patient is physically healthy., Strength: Patient has positive support network., Liability: Patient has intellectual deficits.    Treatment Goals:  Specify outcomes written in observable, behavioral terms:   Hallucinations: decrease frequency and intensity of hallucinations    Treatment Plan/Recommendations:   Pt's mother voices desire to change from Libalvi to alternative due to SE  (sedation, increased appetite)  Will corss titration zyprexa to aripiprazole  Decrease to zyprexa 5mg qam +10mg nightly, start abilify 15mg daily (goal dose 20-30mg daily)  If abilify effective, consider FERRARA  Continue trileptal 300mg bid  Continue xanax 0.5mg daily prn anxiety  Recent labwork in EMR reviewed, CBc wnl, CMP w/ borderline transaminitis, TSH wnl, FLP w/ elevated TGL, hba1c wnl  No need for PEC as pt is not an imminent danger to self or others or gravely disabled due to acute psychiatric illness  Discussed that pt should either call clinic for psychiatric crisis symptoms or present to nearest emergency room    Discussed with patient informed consent including diagnosis, risks and benefits of proposed treatment above vs. alternative treatments vs. no treatment, as well as serious and common side effects of these treatments, and the inherent unpredictability of individual responses to these treatments. The patient expresses understanding of the above and displays the capacity to agree with this current plan. Patient also agrees that, currently, the benefits outweigh the risks and would like to pursue treatment at this time, and had no other questions.    Instructions:  Take all medications as prescribed.    Abstain from recreational drugs and alcohol.  Present to ED or call 911 for SI/HI plan or intent, psychosis, or medical emergency.    Return to Clinic: Follow up in about 2 weeks (around 6/8/2023) for Virtual Visit.  Visits by telemedicine in the future.     Total time:   Complexity (level) of medical decision making employed in the encounter: HIGH    The total time for services performed on the date of the encounter (including review of prior visit notes, review of notes from other providers, review of results from laboratory/imaging studies, face-to-face time with patient, and time spent on other activities directly related to patient care): 50 minutes.    Manny Prater MD  [facility]  [facility]inic

## 2023-05-30 ENCOUNTER — OFFICE VISIT (OUTPATIENT)
Dept: FAMILY MEDICINE | Facility: CLINIC | Age: 23
End: 2023-05-30
Payer: MEDICAID

## 2023-05-30 VITALS
HEART RATE: 109 BPM | SYSTOLIC BLOOD PRESSURE: 108 MMHG | BODY MASS INDEX: 35 KG/M2 | HEIGHT: 64 IN | OXYGEN SATURATION: 96 % | TEMPERATURE: 99 F | DIASTOLIC BLOOD PRESSURE: 69 MMHG | WEIGHT: 205 LBS | RESPIRATION RATE: 20 BRPM

## 2023-05-30 DIAGNOSIS — F84.0 AUTISM: Primary | ICD-10-CM

## 2023-05-30 DIAGNOSIS — Z02.9 ENCOUNTER FOR ADMINISTRATIVE EXAMINATIONS: ICD-10-CM

## 2023-05-30 PROCEDURE — 99213 OFFICE O/P EST LOW 20 MIN: CPT | Mod: PBBFAC

## 2023-05-30 NOTE — PROGRESS NOTES
Chief Complaint  paperwork      History of Present Illness  Rod Siddiqui Jr. is a 22 y.o.  male presents to the clinic accompanied by Mother for 90-L    No acute complaints. Had difficulty getting patient to visit, wouldn't get out of car upon arrival, required some persuasion. Otherwise Mother states he's doing well    Saw Dr. Prater last week, they're happy with him and found beneficial; weaning Zyprexa now 5mg in am 10mg qhs on Trileptal 300mg bid, Abilify 15mg daily and alprazolam 0.5mg prn    Has a sitter [id number] while Mother at work    Review of Systems   Constitutional:  Negative for chills, fever and weight loss.   Respiratory:  Negative for cough and shortness of breath.    Cardiovascular:  Negative for chest pain.   Gastrointestinal:  Negative for abdominal pain, blood in stool, diarrhea, nausea and vomiting.   Genitourinary:  Negative for hematuria.     ROS obtained from Mother due to Medical condition      Physical Exam    Vitals:    05/30/23 1639   BP: 108/69   Pulse: 109   Resp: 20   Temp: 98.8 °F (37.1 °C)     Wt Readings from Last 2 Encounters:   05/30/23 93 kg (205 lb)   05/25/23 92.6 kg (204 lb 1.6 oz)     Gen: NAD; Nonverbal, makes eye contact, smiles when spoken to, looks to Mother frequently, Obese  HEENT: NC/AT, MMM, Oropharynx clear without erythema/exudates  Pulm: Nonlabored, CTABL  CV: RRR, no MRG  Abd: protuberant, soft, NT, ND, Normoactive BS  MSK: no gait abnormalities    Exam limited by patient cooperation      Current Outpatient Medications  Current Outpatient Medications   Medication Instructions    ALPRAZolam (XANAX) 0.5 mg, Oral, 2 times daily PRN    ARIPiprazole (ABILIFY) 15 mg, Oral, Daily    OLANZapine (ZYPREXA) 5 MG tablet Take olanzapine 5mg daily and 10mg nightly.    OXcarbazepine (TRILEPTAL) 300 mg, Oral, 2 times daily             Assessment / Plan:    Autism  Encounter for administrative examinations  90-L completed and scanned into chart  Continue current management  Keep  follow appts with Dr. Prater  Mother requesting telehealth visits   Consider HH as F2F visit are difficult for patient         Follow up:    In 3 months, or earlier if needed.     Shira Mccrary MD  [employer] [facility] PGY-2

## 2023-05-31 NOTE — PROGRESS NOTES
Faculty Attestation: Rod Siddiqui Jr.  was seen in Family Medicine Clinic. Discussed with resident at the time of the visit. History of Present Illness, Physical Exam, and Assessment and Plan reviewed. Treatment plan is reasonable and appropriate. Compliance with treatment recommendations is important.       Tomas Huynh MD

## 2023-06-08 ENCOUNTER — OFFICE VISIT (OUTPATIENT)
Dept: BEHAVIORAL HEALTH | Facility: CLINIC | Age: 23
End: 2023-06-08
Payer: MEDICAID

## 2023-06-08 DIAGNOSIS — F91.3 OPPOSITIONAL DEFIANT DISORDER: ICD-10-CM

## 2023-06-08 DIAGNOSIS — F29 PSYCHOSIS, UNSPECIFIED PSYCHOSIS TYPE: ICD-10-CM

## 2023-06-08 DIAGNOSIS — R44.3 HALLUCINATIONS: Primary | ICD-10-CM

## 2023-06-08 DIAGNOSIS — F84.0 AUTISM: ICD-10-CM

## 2023-06-08 PROCEDURE — 1160F PR REVIEW ALL MEDS BY PRESCRIBER/CLIN PHARMACIST DOCUMENTED: ICD-10-PCS | Mod: CPTII,95,, | Performed by: STUDENT IN AN ORGANIZED HEALTH CARE EDUCATION/TRAINING PROGRAM

## 2023-06-08 PROCEDURE — 99214 PR OFFICE/OUTPT VISIT, EST, LEVL IV, 30-39 MIN: ICD-10-PCS | Mod: 95,AF,HB, | Performed by: STUDENT IN AN ORGANIZED HEALTH CARE EDUCATION/TRAINING PROGRAM

## 2023-06-08 PROCEDURE — 1160F RVW MEDS BY RX/DR IN RCRD: CPT | Mod: CPTII,95,, | Performed by: STUDENT IN AN ORGANIZED HEALTH CARE EDUCATION/TRAINING PROGRAM

## 2023-06-08 PROCEDURE — 1159F MED LIST DOCD IN RCRD: CPT | Mod: CPTII,95,, | Performed by: STUDENT IN AN ORGANIZED HEALTH CARE EDUCATION/TRAINING PROGRAM

## 2023-06-08 PROCEDURE — 1159F PR MEDICATION LIST DOCUMENTED IN MEDICAL RECORD: ICD-10-PCS | Mod: CPTII,95,, | Performed by: STUDENT IN AN ORGANIZED HEALTH CARE EDUCATION/TRAINING PROGRAM

## 2023-06-08 PROCEDURE — 99214 OFFICE O/P EST MOD 30 MIN: CPT | Mod: 95,AF,HB, | Performed by: STUDENT IN AN ORGANIZED HEALTH CARE EDUCATION/TRAINING PROGRAM

## 2023-06-08 RX ORDER — CHLORPROMAZINE HYDROCHLORIDE 25 MG/1
25 TABLET, FILM COATED ORAL 2 TIMES DAILY
Qty: 60 TABLET | Refills: 5 | Status: SHIPPED | OUTPATIENT
Start: 2023-06-08 | End: 2023-06-14 | Stop reason: SDUPTHER

## 2023-06-08 NOTE — PROGRESS NOTES
"Outpatient Psychiatry Follow-Up Visit    6/8/2023    Clinical Status of Patient:  Outpatient (Ambulatory)    Chief Complaint:  Rod Siddiqui Jr. is a 22 y.o. male who presents today for follow-up of autism, ODD. Patient last seen for initial evaluation on 5/25/2023. Met with patient.      Interval History and Content of Current Session:  Interim Events/Subjective Report/Content of Current Session:   Pt reports doing "not good"  overall.  Increased episodes of yelling/arguing ("with the air"), continued hallucinations.  Sleeping "up and down," worsening.  Appetite "ok."  Energy decreased.  Endorses irritability.  Denies SI/HI/AVH/paranoia, denies plan or desire for self harm or harm to othe rs.  Denies SE from current regimen Denies somatic complaints.   Pt happy with current regimen and wants to continue.     Review of Systems   PSYCHIATRIC: Pertinant items are noted in the narrative.  CONSTITUTIONAL: No weight gain or loss.  MUSCULOSKELETAL: No pain or stiffness of the joints.  NEUROLOGIC: No weakness, sensory changes, seizures, confusion, memory loss, tremor or other abnormal movements.  CARDIAC: No CP, no palpitations  RESPIRATORY: No shortness of breath.  CARDIOVASCULAR: No tachycardia or chest pain.  GASTROINTESTINAL: No nausea, vomiting, pain, constipation or diarrhea.    Past Medical, Family and Social History: The patient's past medical, family and social history have been reviewed and updated as appropriate within the electronic medical record - see encounter notes.    Compliance: good    Side effects: denies    Risk Parameters:  Patient reports no suicidal ideation  Patient reports no homicidal ideation  Patient reports no self-injurious behavior  Patient reports no violent behavior    Exam (detailed: at least 9 elements; comprehensive: all 15 elements)   Constitutional  Vitals:  Most recent vital signs, dated less than 90 days prior to this appointment, were reviewed.     OSCAR 2/2 virtual visit.   "     General:   Constitutional: No acute distress, appears stated age, casually dressed    Neurologic:   Motor: moves all extremities spontaneously and without difficulty, no abnormal involuntary movements observed  Gait: normal gait and station    Mental status examination:   Appearance: appears stated age, casually dressed, no acute distress  Behavior: non verbal, standing and watching mother's conversation with provider  Mood: OSCAR  Affect: blunted  Thought process: non verbal  Associations: OSCAR  Thought content: OSCAR  Perceptions: OSCAR  Orientation: OSCAR  Language: no speech  Attention: OSCAR  Insight: OSCAR  Judgement: OSCAR    PHQ9 5/25/2023   Total Score 8     GAD7 5/25/2023   1. Feeling nervous, anxious, or on edge? 3   2. Not being able to stop or control worrying? 0   3. Worrying too much about different things? 0   4. Trouble relaxing? 1   5. Being so restless that it is hard to sit still? 2   6. Becoming easily annoyed or irritable? 2   7. Feeling afraid as if something awful might happen? 2   8. If you checked off any problems, how difficult have these problems made it for you to do your work, take care of things at home, or get along with other people? 1   OLVIN-7 Score 10     Assessment and Diagnosis   Status/Progress: Based on the examination today, the patient's problem(s) is/are inadequately controlled.  New problems have been presented today (SE from medication).   Co-morbidities and Lack of compliance are not complicating management of the primary condition.  Number of separate conditions addressed during today's visit: 2 (behavior unchanged, psychosis unchanged).  Are medication adjustments being made today: yes.  Are referral(s) being ordered today: no.  Complexity (level) of medical decision making employed in the encounter: high.    General Impression:    ICD-10-CM ICD-9-CM   1. Hallucinations  R44.3 780.1   2. Autism  F84.0 299.00   3. Oppositional defiant disorder  F91.3 313.81   4. Psychosis,  unspecified psychosis type  F29 298.9     Intervention/Counseling/Treatment Plan   Continue zyprexa 5mg qam +10mg nightly  Discontinue abilify due to lack of benefit  Start thorazine 25mg bid, Discussed potential SE including but not limited to sedation, metabolic disturbance, weight gain, movement disorder (including TD)  Continue trileptal 300mg bid  Continue xanax 0.5mg daily prn anxiety (none needed since last visit)  Recent labwork in EMR reviewed, CBc wnl, CMP w/ borderline transaminitis, TSH wnl, FLP w/ elevated TGL, hba1c wnl  No need for PEC as pt is not an imminent danger to self or others or gravely disabled due to acute psychiatric illness  Discussed that pt should either call clinic for psychiatric crisis symptoms or present to nearest emergency room    Discussed with patient informed consent including diagnosis, risks and benefits of proposed treatment above vs. alternative treatments vs. no treatment, as well as serious and common side effects of these treatments, and the inherent unpredictability of individual responses to these treatments. The patient expresses understanding of the above and displays the capacity to agree with this current plan. Patient also agrees that, currently, the benefits outweigh the risks and would like to pursue treatment at this time, and had no other questions.    Instructions:  Take all medications as prescribed.    Abstain from recreational drugs and alcohol.  Present to ED or call 911 for SI/HI plan or intent, psychosis, or medical emergency.    Return to Clinic: Follow up in about 4 weeks (around 7/6/2023).    Total time:   The total time for services performed on the date of the encounter (including review of prior visit notes, review of notes from other providers, review of results from laboratory/imaging studies, face-to-face time with patient, and time spent on other activities directly related to patient care): 30 minutes.    Manny Prater MD  [facility]  [facility]

## 2023-06-14 ENCOUNTER — TELEPHONE (OUTPATIENT)
Dept: FAMILY MEDICINE | Facility: CLINIC | Age: 23
End: 2023-06-14
Payer: MEDICAID

## 2023-06-14 DIAGNOSIS — R44.3 HALLUCINATIONS: Primary | ICD-10-CM

## 2023-06-14 RX ORDER — CHLORPROMAZINE HYDROCHLORIDE 50 MG/1
50 TABLET, FILM COATED ORAL 2 TIMES DAILY
Qty: 60 TABLET | Refills: 5 | Status: SHIPPED | OUTPATIENT
Start: 2023-06-14 | End: 2023-07-25 | Stop reason: SDUPTHER

## 2023-06-14 NOTE — TELEPHONE ENCOUNTER
Pt's mom reports that he is hollering, arguing and wanting to fight with who he sees in front of him (visual hallucinations), and since starting thorazine he is not sleeping well.  She is asking if the dose should be increased and she is also wanting to know if the zyprexa 10mg at night can go down to 5mg.    She would like a return call from you     Please advise

## 2023-06-14 NOTE — TELEPHONE ENCOUNTER
Pt called to report that pt's symptoms have not improved with medication change.  Aggression, hallucinations, shouting and poor sleep have continued.  Increased appetite and weight gain have continued.  Will increase thorazine to 50mg bid and decrease olanzapine to 10mg nightly (with plan to eventually discontinue).  All questions answered.

## 2023-07-06 ENCOUNTER — OFFICE VISIT (OUTPATIENT)
Dept: BEHAVIORAL HEALTH | Facility: CLINIC | Age: 23
End: 2023-07-06
Payer: MEDICAID

## 2023-07-06 DIAGNOSIS — F84.0 AUTISM: ICD-10-CM

## 2023-07-06 DIAGNOSIS — F29 PSYCHOSIS, UNSPECIFIED PSYCHOSIS TYPE: Primary | ICD-10-CM

## 2023-07-06 PROCEDURE — 1160F PR REVIEW ALL MEDS BY PRESCRIBER/CLIN PHARMACIST DOCUMENTED: ICD-10-PCS | Mod: CPTII,95,, | Performed by: STUDENT IN AN ORGANIZED HEALTH CARE EDUCATION/TRAINING PROGRAM

## 2023-07-06 PROCEDURE — 1159F MED LIST DOCD IN RCRD: CPT | Mod: CPTII,95,, | Performed by: STUDENT IN AN ORGANIZED HEALTH CARE EDUCATION/TRAINING PROGRAM

## 2023-07-06 PROCEDURE — 99213 OFFICE O/P EST LOW 20 MIN: CPT | Mod: AF,HB,95, | Performed by: STUDENT IN AN ORGANIZED HEALTH CARE EDUCATION/TRAINING PROGRAM

## 2023-07-06 PROCEDURE — 99213 PR OFFICE/OUTPT VISIT, EST, LEVL III, 20-29 MIN: ICD-10-PCS | Mod: AF,HB,95, | Performed by: STUDENT IN AN ORGANIZED HEALTH CARE EDUCATION/TRAINING PROGRAM

## 2023-07-06 PROCEDURE — 1159F PR MEDICATION LIST DOCUMENTED IN MEDICAL RECORD: ICD-10-PCS | Mod: CPTII,95,, | Performed by: STUDENT IN AN ORGANIZED HEALTH CARE EDUCATION/TRAINING PROGRAM

## 2023-07-06 PROCEDURE — 1160F RVW MEDS BY RX/DR IN RCRD: CPT | Mod: CPTII,95,, | Performed by: STUDENT IN AN ORGANIZED HEALTH CARE EDUCATION/TRAINING PROGRAM

## 2023-07-06 RX ORDER — OLANZAPINE 5 MG/1
5 TABLET ORAL NIGHTLY
Qty: 14 TABLET | Refills: 0 | Status: SHIPPED | OUTPATIENT
Start: 2023-07-06 | End: 2023-07-25 | Stop reason: ALTCHOICE

## 2023-07-06 RX ORDER — OXCARBAZEPINE 300 MG/1
300 TABLET, FILM COATED ORAL 2 TIMES DAILY
Qty: 60 TABLET | Refills: 5 | Status: SHIPPED | OUTPATIENT
Start: 2023-07-06 | End: 2023-12-15 | Stop reason: SDUPTHER

## 2023-07-06 NOTE — PROGRESS NOTES
"Outpatient Psychiatry Follow-Up Visit    7/6/2023    Clinical Status of Patient:  Outpatient (Ambulatory)    Chief Complaint:  Rod Siddiqui Jr. is a 23 y.o. male who presents today for follow-up of autism, ODD. Patient last seen for initial evaluation on 5/25/2023. Met with patient and pt's mother.      TELE PSYCHIATRY Disclaimer   *The patient was informed despite using HIPPA compliant technology there may be risks including security breach, technological failure, inability to perform a comprehensive physical exam which could delay or prevent an accurate diagnosis, and potential complications from treatment decisions rendered over a telemedical platform.   The patient was also informed of the relationship between the physician and patient and the respective role of any other health care provider with respect to management of the patient; and notified that the pt may decline to receive medical services by telemedicine and may withdraw from such care at any time.     Patient's Current location: [address] [place] [address]    In Case of Emergency pts next of kin  Name:Alicia Sanders (mother)  Phone number:  [phone]  Visit type: Virtual visit with synchronous audio and video  Total time spent with patient: 22 minutes    Interval History and Content of Current Session:  Interim Events/Subjective Report/Content of Current Session:   Pt non verbal, history obtained from pt's mother.  Pt's mother notes improvement in pt's behavior.  Now having outbursts every other day and much less intense/less long than in the past.  Hallucinations continued but less intense.  Sleep "pretty well." Appetite improving, weight stabilizing.  Energy good.  Improving irritability.  Denies SI/HI, denies plan or desire for self harm or harm to others.  Denies SE from current regimen. Denies somatic complaints.   Pt voices desire to adjust regimen to address ongoing symptoms.      Review of Systems   PSYCHIATRIC: " Pertinant items are noted in the narrative.  CONSTITUTIONAL: No weight gain or loss.  MUSCULOSKELETAL: No pain or stiffness of the joints.  NEUROLOGIC: No weakness, sensory changes, seizures, confusion, memory loss, tremor or other abnormal movements.  CARDIAC: No CP, no palpitations  RESPIRATORY: No shortness of breath.  CARDIOVASCULAR: No tachycardia or chest pain.  GASTROINTESTINAL: No nausea, vomiting, pain, constipation or diarrhea.    Past Medical, Family and Social History: The patient's past medical, family and social history have been reviewed and updated as appropriate within the electronic medical record - see encounter notes.    Compliance: good    Side effects: denies    Risk Parameters:  Patient reports no suicidal ideation  Patient reports no homicidal ideation  Patient reports no self-injurious behavior  Patient reports no violent behavior    Exam (detailed: at least 9 elements; comprehensive: all 15 elements)   Constitutional  Vitals:  Most recent vital signs, dated less than 90 days prior to this appointment, were reviewed.     [facility] 2/2 virtual visit.       General:   Constitutional: No acute distress, appears stated age, casually dressed    Neurologic:   Motor: moves all extremities spontaneously and without difficulty, no abnormal involuntary movements observed  Gait: normal gait and station    Mental status examination:   Appearance: appears stated age, casually dressed, no acute distress  Behavior: non verbal, standing and watching mother's conversation with provider  Mood: [facility]  Affect: blunted  Thought process: non verbal  Associations: [facility]  Thought content: [facility]  Perceptions: [facility]  Orientation: [facility]  Language: no speech  Attention: [facility]  Insight: [facility]  Judgement: [facility]    PHQ9 5/25/2023   Total Score 8     GAD7 5/25/2023   1. Feeling nervous, anxious, or on edge? 3   2. Not being able to stop or control worrying? 0   3. Worrying too much about different things? 0   4. Trouble relaxing? 1   5. Being  so restless that it is hard to sit still? 2   6. Becoming easily annoyed or irritable? 2   7. Feeling afraid as if something awful might happen? 2   8. If you checked off any problems, how difficult have these problems made it for you to do your work, take care of things at home, or get along with other people? 1   OLVIN-7 Score 10     Assessment and Diagnosis   Status/Progress: Based on the examination today, the patient's problem(s) is/are improved.  New problems have not been presented today.   Co-morbidities and Lack of compliance are not complicating management of the primary condition.  Number of separate conditions addressed during today's visit: 1 (psychosis).  Are medication adjustments being made today: Yes.  Are referral(s) being ordered today: No.  Complexity (level) of medical decision making employed in the encounter: MODERATE.    General Impression:    ICD-10-CM ICD-9-CM   1. Psychosis, unspecified psychosis type  F29 298.9   2. Autism  F84.0 299.00     Intervention/Counseling/Treatment Plan   Decrease zyprexa to 5mg nightly x14 days, then stop  Continue thorazine 50mg bid  Continue trileptal 300mg bid  Continue xanax 0.5mg daily prn anxiety (none needed since last visit)  Recent labwork in EMR reviewed, CBc wnl, CMP w/ borderline transaminitis, TSH wnl, FLP w/ elevated TGL, hba1c wnl  No need for PEC as pt is not an imminent danger to self or others or gravely disabled due to acute psychiatric illness  Discussed that pt should either call clinic for psychiatric crisis symptoms or present to nearest emergency room    Discussed with patient informed consent including diagnosis, risks and benefits of proposed treatment above vs. alternative treatments vs. no treatment, as well as serious and common side effects of these treatments, and the inherent unpredictability of individual responses to these treatments. The patient expresses understanding of the above and displays the capacity to agree with this  current plan. Patient also agrees that, currently, the benefits outweigh the risks and would like to pursue treatment at this time, and had no other questions.    Instructions:  Take all medications as prescribed.    Abstain from recreational drugs and alcohol.  Present to ED or call 911 for SI/HI plan or intent, psychosis, or medical emergency.    Return to Clinic: Follow up in about 2 months (around 9/6/2023) for Virtual Visit.    Total time:   The total time for services performed on the date of the encounter (including review of prior visit notes, review of notes from other providers, review of results from laboratory/imaging studies, face-to-face time with patient, and time spent on other activities directly related to patient care): 30 minutes.    Manny Prater MD  [facility]

## 2023-07-25 ENCOUNTER — PATIENT MESSAGE (OUTPATIENT)
Dept: BEHAVIORAL HEALTH | Facility: CLINIC | Age: 23
End: 2023-07-25
Payer: MEDICAID

## 2023-07-25 DIAGNOSIS — R44.3 HALLUCINATIONS: Primary | ICD-10-CM

## 2023-07-25 RX ORDER — CHLORPROMAZINE HYDROCHLORIDE 50 MG/1
TABLET, FILM COATED ORAL
Qty: 90 TABLET | Refills: 5 | Status: SHIPPED | OUTPATIENT
Start: 2023-07-25 | End: 2023-08-02 | Stop reason: DRUGHIGH

## 2023-08-02 ENCOUNTER — OFFICE VISIT (OUTPATIENT)
Dept: BEHAVIORAL HEALTH | Facility: CLINIC | Age: 23
End: 2023-08-02
Payer: MEDICAID

## 2023-08-02 DIAGNOSIS — R44.3 HALLUCINATIONS: Primary | ICD-10-CM

## 2023-08-02 DIAGNOSIS — G25.71 NEUROLEPTIC-INDUCED ACUTE AKATHISIA: ICD-10-CM

## 2023-08-02 DIAGNOSIS — T43.505A NEUROLEPTIC-INDUCED ACUTE AKATHISIA: ICD-10-CM

## 2023-08-02 PROCEDURE — 1159F MED LIST DOCD IN RCRD: CPT | Mod: CPTII,95,, | Performed by: STUDENT IN AN ORGANIZED HEALTH CARE EDUCATION/TRAINING PROGRAM

## 2023-08-02 PROCEDURE — 1160F RVW MEDS BY RX/DR IN RCRD: CPT | Mod: CPTII,95,, | Performed by: STUDENT IN AN ORGANIZED HEALTH CARE EDUCATION/TRAINING PROGRAM

## 2023-08-02 PROCEDURE — 99214 OFFICE O/P EST MOD 30 MIN: CPT | Mod: AF,HB,95, | Performed by: STUDENT IN AN ORGANIZED HEALTH CARE EDUCATION/TRAINING PROGRAM

## 2023-08-02 PROCEDURE — 1159F PR MEDICATION LIST DOCUMENTED IN MEDICAL RECORD: ICD-10-PCS | Mod: CPTII,95,, | Performed by: STUDENT IN AN ORGANIZED HEALTH CARE EDUCATION/TRAINING PROGRAM

## 2023-08-02 PROCEDURE — 99214 PR OFFICE/OUTPT VISIT, EST, LEVL IV, 30-39 MIN: ICD-10-PCS | Mod: AF,HB,95, | Performed by: STUDENT IN AN ORGANIZED HEALTH CARE EDUCATION/TRAINING PROGRAM

## 2023-08-02 PROCEDURE — 1160F PR REVIEW ALL MEDS BY PRESCRIBER/CLIN PHARMACIST DOCUMENTED: ICD-10-PCS | Mod: CPTII,95,, | Performed by: STUDENT IN AN ORGANIZED HEALTH CARE EDUCATION/TRAINING PROGRAM

## 2023-08-02 RX ORDER — PROPRANOLOL HYDROCHLORIDE 10 MG/1
10 TABLET ORAL 2 TIMES DAILY
Qty: 60 TABLET | Refills: 2 | Status: SHIPPED | OUTPATIENT
Start: 2023-08-02 | End: 2023-09-14 | Stop reason: SDUPTHER

## 2023-08-02 RX ORDER — CHLORPROMAZINE HYDROCHLORIDE 200 MG/1
200 TABLET, FILM COATED ORAL 2 TIMES DAILY
Qty: 60 TABLET | Refills: 2 | Status: SHIPPED | OUTPATIENT
Start: 2023-08-02 | End: 2023-09-14 | Stop reason: SDUPTHER

## 2023-08-02 NOTE — PROGRESS NOTES
Outpatient Psychiatry Follow-Up Visit    8/2/2023    Clinical Status of Patient:  Outpatient (Ambulatory)    Chief Complaint:  Rod Siddiqui Jr. is a 23 y.o. male who presents today for follow-up of autism, ODD. Patient last seen for initial evaluation on 5/25/2023. Met with patient and pt's mother.      TELE PSYCHIATRY Disclaimer   *The patient was informed despite using HIPPA compliant technology there may be risks including security breach, technological failure, inability to perform a comprehensive physical exam which could delay or prevent an accurate diagnosis, and potential complications from treatment decisions rendered over a telemedical platform.   The patient was also informed of the relationship between the physician and patient and the respective role of any other health care provider with respect to management of the patient; and notified that the pt may decline to receive medical services by telemedicine and may withdraw from such care at any time.     Patient's Current location: [address] [place] [address]    In Case of Emergency pts next of kin  Name:Alicia Sanders (mother)  Phone number:  [phone]  Visit type: Virtual visit with synchronous audio and video  Total time spent with patient: 22 minutes    Interval History and Content of Current Session:  Interim Events/Subjective Report/Content of Current Session:   Pt non verbal, history obtained from pt's mother.  Pt's mother notes no change in pt's behavior.  Continues to have multiple daily outbursts.  No benefit from medication.  Continues to pace.  Poor sleep.  No SE noted, no somatic complaints noted.  Pt's mother voices interest in adjusting regimen to address ongoing symptoms.      Review of Systems   [facility] 2/2 current mentation    Past Medical, Family and Social History: The patient's past medical, family and social history have been reviewed and updated as appropriate within the electronic medical record - see  encounter notes.    Compliance: good    Side effects: denies    Risk Parameters:  Patient reports no suicidal ideation  Patient reports no homicidal ideation  Patient reports no self-injurious behavior  Patient reports no violent behavior    Exam (detailed: at least 9 elements; comprehensive: all 15 elements)   Constitutional  Vitals:  Most recent vital signs, dated less than 90 days prior to this appointment, were reviewed.     [facility] 2/2 virtual visit.       General:   Constitutional: No acute distress, appears stated age, casually dressed    Neurologic:   Motor: moves all extremities spontaneously and without difficulty, no abnormal involuntary movements observed  Gait: normal gait and station    Mental status examination:   Appearance: appears stated age, casually dressed, no acute distress  Behavior: non verbal, standing and watching mother's conversation with provider  Mood: [facility]  Affect: blunted  Thought process: non verbal  Associations: [facility]  Thought content: [facility]  Perceptions: [facility]  Orientation: [facility]  Language: no speech  Attention: [facility]  Insight: [facility]  Judgement: [facility]    PHQ9 5/25/2023   Total Score 8     GAD7 5/25/2023   1. Feeling nervous, anxious, or on edge? 3   2. Not being able to stop or control worrying? 0   3. Worrying too much about different things? 0   4. Trouble relaxing? 1   5. Being so restless that it is hard to sit still? 2   6. Becoming easily annoyed or irritable? 2   7. Feeling afraid as if something awful might happen? 2   8. If you checked off any problems, how difficult have these problems made it for you to do your work, take care of things at home, or get along with other people? 1   OLVIN-7 Score 10     Assessment and Diagnosis   Status/Progress: Based on the examination today, the patient's problem(s) is/are improved and inadequately controlled.  New problems have been presented today (akithisia?).   Co-morbidities and Lack of compliance are not complicating management of the primary  condition.  Number of separate conditions addressed during today's visit: 1 (psychosis uncontrolled, akithisia new).  Are medication adjustments being made today: Yes.  Are referral(s) being ordered today: No.  Complexity (level) of medical decision making employed in the encounter: MODERATE.    General Impression:    ICD-10-CM ICD-9-CM   1. Hallucinations  R44.3 780.1   2. Neuroleptic-induced acute akathisia  G25.71 333.99    T43.505A E939.3     Intervention/Counseling/Treatment Plan   Increase thorazine to 200mg bid  Start propranolol 10mg bid for suspected akithisia, discussed potential Se including but not limited to low BP  Continue trileptal 300mg bid  Continue xanax 0.5mg daily prn anxiety (none needed since last visit)  Recent labwork in EMR reviewed, CBc wnl, CMP w/ borderline transaminitis, TSH wnl, FLP w/ elevated TGL, hba1c wnl  No need for PEC as pt is not an imminent danger to self or others or gravely disabled due to acute psychiatric illness  Discussed that pt should either call clinic for psychiatric crisis symptoms or present to nearest emergency room    Discussed with patient informed consent including diagnosis, risks and benefits of proposed treatment above vs. alternative treatments vs. no treatment, as well as serious and common side effects of these treatments, and the inherent unpredictability of individual responses to these treatments. The patient expresses understanding of the above and displays the capacity to agree with this current plan. Patient also agrees that, currently, the benefits outweigh the risks and would like to pursue treatment at this time, and had no other questions.    Instructions:  Take all medications as prescribed.    Abstain from recreational drugs and alcohol.  Present to ED or call 911 for SI/HI plan or intent, psychosis, or medical emergency.    Return to Clinic: Follow up in about 6 weeks (around 9/13/2023).    Total time:   The total time for services performed  on the date of the encounter (including review of prior visit notes, review of notes from other providers, review of results from laboratory/imaging studies, face-to-face time with patient, and time spent on other activities directly related to patient care): 30 minutes.    Manny Prater MD  [facility]

## 2023-08-04 ENCOUNTER — OFFICE VISIT (OUTPATIENT)
Dept: FAMILY MEDICINE | Facility: CLINIC | Age: 23
End: 2023-08-04
Payer: MEDICAID

## 2023-08-04 DIAGNOSIS — R44.3 HALLUCINATIONS: ICD-10-CM

## 2023-08-04 DIAGNOSIS — F84.0 AUTISM: Primary | ICD-10-CM

## 2023-08-04 DIAGNOSIS — F17.200 TOBACCO DEPENDENCE: ICD-10-CM

## 2023-08-04 NOTE — PROGRESS NOTES
Telemedicine visit conducted with patient's mom per audio due to technical difficulty accessing the virtual application appointment.

## 2023-08-04 NOTE — PROGRESS NOTES
The patient location is: [place], [place]  The chief complaint leading to consultation is: Hallucination f/u    Visit type: audio only . No audiovisual input available at the moment      17 minutes of total time spent on the encounter, which includes face to face time and non-face to face time preparing to see the patient (eg, review of tests), Obtaining and/or reviewing separately obtained history, Documenting clinical information in the electronic or other health record, Independently interpreting results (not separately reported) and communicating results to the patient/family/caregiver, or Care coordination (not separately reported).         Each patient to whom he or she provides medical services by telemedicine is:  (1) informed of the relationship between the physician and patient and the respective role of any other health care provider with respect to management of the patient; and (2) notified that he or she may decline to receive medical services by telemedicine and may withdraw from such care at any time.    History of Present Illness  Rod Siddiqui Jr. is a 22 y.o. year old male with PMH of Autism (non-verbal), ODD, GERD, Hallucinations presents to the clinic for hallucinations f/u; last seen 5/30/2023     Had initial visit with Dr. Prater 8/2/2023. Made some medication changes, please refer to visit note      Mother, Alicia Sanders, reports persist hallucinations visual, auditory and tactile hallucinations; some are pleasant and becomes more affectionate and sometimes agitated. Increase in Thorazine is helping with sleep. Has persistent anxiety. Mother denies hallucinations telling him to harm himself or others. Mother doesn't feel unsafe or threatened, no history of physical abuse.      Coughing started chain smoking with increased anxiety. Now smokes 1 ppd x 7 years. Mother's buys the cigarettes.     Review of Systems  ROS-as noted in HPI        Current Outpatient Medications  Outpatient Medications  as of 8/4/2023   Medication Sig Dispense Refill    ALPRAZolam (XANAX) 0.5 MG tablet Take 0.5 mg by mouth 2 (two) times daily as needed.      chlorproMAZINE (THORAZINE) 200 MG tablet Take 1 tablet (200 mg total) by mouth 2 (two) times a day. 60 tablet 2    OXcarbazepine (TRILEPTAL) 300 MG Tab Take 1 tablet (300 mg total) by mouth 2 (two) times daily. 60 tablet 5    propranoloL (INDERAL) 10 MG tablet Take 1 tablet (10 mg total) by mouth 2 (two) times daily. 60 tablet 2     No current facility-administered medications on file as of 8/4/2023.          Assessment / Plan:     Autism  Hallucinations  Tobacco dependence    No SI/HI, no history of aggression or violence  Keep all scheduled appts Dr. Prater's clinic.   Mother counseled on harmful effects of smoking and recommended smoking cessation to improve health    ED precautions provided    RTC in 4 months, or sooner if needed    Shira Mccrary MD  [employer]  PGY-3

## 2023-09-14 ENCOUNTER — OFFICE VISIT (OUTPATIENT)
Dept: BEHAVIORAL HEALTH | Facility: CLINIC | Age: 23
End: 2023-09-14
Payer: MEDICAID

## 2023-09-14 DIAGNOSIS — G25.71 NEUROLEPTIC-INDUCED ACUTE AKATHISIA: ICD-10-CM

## 2023-09-14 DIAGNOSIS — T43.505A NEUROLEPTIC-INDUCED ACUTE AKATHISIA: ICD-10-CM

## 2023-09-14 DIAGNOSIS — F51.04 PSYCHOPHYSIOLOGICAL INSOMNIA: ICD-10-CM

## 2023-09-14 DIAGNOSIS — R44.3 HALLUCINATIONS: ICD-10-CM

## 2023-09-14 DIAGNOSIS — F29 PSYCHOSIS, UNSPECIFIED PSYCHOSIS TYPE: Primary | ICD-10-CM

## 2023-09-14 PROCEDURE — 99214 OFFICE O/P EST MOD 30 MIN: CPT | Mod: AF,HB,95, | Performed by: STUDENT IN AN ORGANIZED HEALTH CARE EDUCATION/TRAINING PROGRAM

## 2023-09-14 PROCEDURE — 99214 PR OFFICE/OUTPT VISIT, EST, LEVL IV, 30-39 MIN: ICD-10-PCS | Mod: AF,HB,95, | Performed by: STUDENT IN AN ORGANIZED HEALTH CARE EDUCATION/TRAINING PROGRAM

## 2023-09-14 PROCEDURE — 1160F RVW MEDS BY RX/DR IN RCRD: CPT | Mod: CPTII,95,, | Performed by: STUDENT IN AN ORGANIZED HEALTH CARE EDUCATION/TRAINING PROGRAM

## 2023-09-14 PROCEDURE — 1160F PR REVIEW ALL MEDS BY PRESCRIBER/CLIN PHARMACIST DOCUMENTED: ICD-10-PCS | Mod: CPTII,95,, | Performed by: STUDENT IN AN ORGANIZED HEALTH CARE EDUCATION/TRAINING PROGRAM

## 2023-09-14 PROCEDURE — 1159F MED LIST DOCD IN RCRD: CPT | Mod: CPTII,95,, | Performed by: STUDENT IN AN ORGANIZED HEALTH CARE EDUCATION/TRAINING PROGRAM

## 2023-09-14 PROCEDURE — 1159F PR MEDICATION LIST DOCUMENTED IN MEDICAL RECORD: ICD-10-PCS | Mod: CPTII,95,, | Performed by: STUDENT IN AN ORGANIZED HEALTH CARE EDUCATION/TRAINING PROGRAM

## 2023-09-14 RX ORDER — TRAZODONE HYDROCHLORIDE 50 MG/1
TABLET ORAL
Qty: 60 TABLET | Refills: 2 | Status: SHIPPED | OUTPATIENT
Start: 2023-09-14 | End: 2024-01-17 | Stop reason: SDUPTHER

## 2023-09-14 RX ORDER — CHLORPROMAZINE HYDROCHLORIDE 200 MG/1
200 TABLET, FILM COATED ORAL 2 TIMES DAILY
Qty: 60 TABLET | Refills: 5 | Status: SHIPPED | OUTPATIENT
Start: 2023-09-14 | End: 2024-09-13

## 2023-09-14 RX ORDER — PROPRANOLOL HYDROCHLORIDE 10 MG/1
10 TABLET ORAL 2 TIMES DAILY
Qty: 60 TABLET | Refills: 5 | Status: SHIPPED | OUTPATIENT
Start: 2023-09-14 | End: 2023-12-15 | Stop reason: SDUPTHER

## 2023-09-14 NOTE — PROGRESS NOTES
Outpatient Psychiatry Follow-Up Visit    9/14/2023    Clinical Status of Patient:  Outpatient (Ambulatory)    Chief Complaint:  Rod Siddiqui Jr. is a 23 y.o. male who presents today for follow-up of autism, ODD. Patient last seen for follow-up on 8/2/2023. Met with patient and pt's mother.      TELE PSYCHIATRY Disclaimer   *The patient was informed despite using HIPPA compliant technology there may be risks including security breach, technological failure, inability to perform a comprehensive physical exam which could delay or prevent an accurate diagnosis, and potential complications from treatment decisions rendered over a telemedical platform.   The patient was also informed of the relationship between the physician and patient and the respective role of any other health care provider with respect to management of the patient; and notified that the pt may decline to receive medical services by telemedicine and may withdraw from such care at any time.     Patient's Current location: [address] [place] [address]    In Case of Emergency pts next of kin  Name:Alicia Sanders (mother)   Phone number:  [phone]  Visit type: Virtual visit with synchronous audio and video  Total time spent with patient: 30 minutes    Interval History and Content of Current Session:  Interim Events/Subjective Report/Content of Current Session:   Pt notes pt is doing well overall.  Notes decreased outbursts.  Pt is hearing voices less, less RIS.  Notes continued difficulty with sleeping.  Notes irritability due to poor sleep.  Mild sedation effect from thorazine.  Appetite normalized.  Energy unchanged overall.  Denies SI/HI, no observed self harm behavior.  Somatic complaint of nausea noted.  Pt's mother wants to look into options for sleep.      Review of Systems   [facility] 2/2 current mentation    Past Medical, Family and Social History: The patient's past medical, family and social history have been reviewed and  updated as appropriate within the electronic medical record - see encounter notes.    Compliance: good    Side effects: denies    Risk Parameters:  Patient reports no suicidal ideation  Patient reports no homicidal ideation  Patient reports no self-injurious behavior  Patient reports no violent behavior    Exam (detailed: at least 9 elements; comprehensive: all 15 elements)   Constitutional  Vitals:  Most recent vital signs, dated less than 90 days prior to this appointment, were reviewed.     [facility] 2/2 virtual visit.       General:   Constitutional: No acute distress, appears stated age, casually dressed    Neurologic:   Motor: moves all extremities spontaneously and without difficulty, no abnormal involuntary movements observed  Gait: normal gait and station    Mental status examination:   Appearance: appears stated age, casually dressed, no acute distress  Behavior: non verbal, standing and watching mother's conversation with provider  Mood: [facility]  Affect: blunted  Thought process: non verbal  Associations: [facility]  Thought content: [facility]  Perceptions: [facility]  Orientation: [facility]  Language: no speech  Attention: [facility]  Insight: [facility]  Judgement: [facility]    PHQ9:       No data to display                  5/25/2023     9:24 AM   GAD7   1. Feeling nervous, anxious, or on edge? 3   2. Not being able to stop or control worrying? 0   3. Worrying too much about different things? 0   4. Trouble relaxing? 1   5. Being so restless that it is hard to sit still? 2   6. Becoming easily annoyed or irritable? 2   7. Feeling afraid as if something awful might happen? 2   8. If you checked off any problems, how difficult have these problems made it for you to do your work, take care of things at home, or get along with other people? 1   OLVIN-7 Score 10     Assessment and Diagnosis   Status/Progress: Based on the examination today, the patient's problem(s) is/are resolving.  New problems have been presented today.   Co-morbidities and Lack of compliance  are not complicating management of the primary condition.  Number of separate conditions addressed during today's visit: 3 (psychosis improved, akithisia improved, insomnia poorly controlled).  Are medication adjustments being made today: yes.  Are referral(s) being ordered today: No.  Complexity (level) of medical decision making employed in the encounter: MODERATE.    General Impression:    ICD-10-CM ICD-9-CM   1. Psychosis, unspecified psychosis type  F29 298.9   2. Psychophysiological insomnia  F51.04 307.42   3. Neuroleptic-induced acute akathisia  G25.71 333.99    T43.505A E939.3   4. Hallucinations  R44.3 780.1     Intervention/Counseling/Treatment Plan   Continue thorazine 200mg bid  Continue propranolol 10mg bid for akithisia  Continue trileptal 300mg bid  Start trazodone 50-100mg nightly as needed for insomnia, discussed potential SE including but not limited to sedation, suicidal thinking/behavior, dry mouth  Continue xanax 0.5mg daily prn anxiety (none needed since last visit)  Pt's mother asked about options for labwork in the future, wanted to look into home health options, will investigate on pt's behalf  Recent labwork in EMR reviewed, CBc wnl, CMP w/ borderline transaminitis, TSH wnl, FLP w/ elevated TGL, hba1c wnl  No need for PEC as pt is not an imminent danger to self or others or gravely disabled due to acute psychiatric illness  Discussed that pt should either call clinic for psychiatric crisis symptoms or present to nearest emergency room    Discussed with patient informed consent including diagnosis, risks and benefits of proposed treatment above vs. alternative treatments vs. no treatment, as well as serious and common side effects of these treatments, and the inherent unpredictability of individual responses to these treatments. The patient expresses understanding of the above and displays the capacity to agree with this current plan. Patient also agrees that, currently, the benefits  outweigh the risks and would like to pursue treatment at this time, and had no other questions.    Instructions:  Take all medications as prescribed.    Abstain from recreational drugs and alcohol.  Present to ED or call 911 for SI/HI plan or intent, psychosis, or medical emergency.    Return to Clinic: Follow up in about 3 months (around 12/14/2023).    Total time:   The total time for services performed on the date of the encounter (including review of prior visit notes, review of notes from other providers, review of results from laboratory/imaging studies, face-to-face time with patient, and time spent on other activities directly related to patient care): 30 minutes.    Manny Prater MD  [facility]

## 2023-12-12 ENCOUNTER — OFFICE VISIT (OUTPATIENT)
Dept: FAMILY MEDICINE | Facility: CLINIC | Age: 23
End: 2023-12-12
Payer: MEDICAID

## 2023-12-12 VITALS
BODY MASS INDEX: 35.73 KG/M2 | RESPIRATION RATE: 20 BRPM | OXYGEN SATURATION: 98 % | TEMPERATURE: 99 F | DIASTOLIC BLOOD PRESSURE: 62 MMHG | WEIGHT: 201.63 LBS | HEIGHT: 63 IN | HEART RATE: 82 BPM | SYSTOLIC BLOOD PRESSURE: 91 MMHG

## 2023-12-12 DIAGNOSIS — F17.200 TOBACCO DEPENDENCE: ICD-10-CM

## 2023-12-12 DIAGNOSIS — F84.0 AUTISM: ICD-10-CM

## 2023-12-12 DIAGNOSIS — R05.9 COUGH, UNSPECIFIED TYPE: Primary | ICD-10-CM

## 2023-12-12 PROCEDURE — 99214 OFFICE O/P EST MOD 30 MIN: CPT | Mod: PBBFAC | Performed by: STUDENT IN AN ORGANIZED HEALTH CARE EDUCATION/TRAINING PROGRAM

## 2023-12-12 NOTE — PROGRESS NOTES
Chief Complaint  Referral (For [facility] in [place]) and Cough (Coughs until he vomits, onset after psych meds were changed onset 6 to 8 months ago)      History of Present Illness  Rod Siddiqui Jr. is a 23 y.o. male presents to the clinic for cough x 6-8 months    History obtained from Mother due to medical condition    Nonproductive cough, intermittent every 3-4days  Has posttusive emesis when he has the cough, mucous and clear  One episode reported a frontal headache prior to vomiting  No congestion, fever, rhinorrhea, sob, wheezing, rash, diarrhea  Mom gave omeprazole thinking it was reflux, no change  Smokes a pack a day      ROS per HPI      Physical Exam    Vitals:    12/12/23 1019   BP: 91/62   Pulse: 82   Resp: 20   Temp: 98.5 °F (36.9 °C)     Wt Readings from Last 2 Encounters:   12/14/23 91.9 kg (202 lb 11.2 oz)   12/12/23 91.4 kg (201 lb 9.6 oz)     Gen: NAD  HEENT: MMM, no oropharyngeal erythema  Pulm: Nonlabored, CTABL  CV: RRR, no MRG, no peripheral edema  Abd: protuberant, soft, NT, ND, Normoactive BS  MSK: no gait abnormalities      Current Outpatient Medications  Current Outpatient Medications   Medication Instructions    ALPRAZolam (XANAX) 0.5 mg, Oral, 2 times daily PRN    chlorproMAZINE (THORAZINE) 200 mg, Oral, 2 times daily    OXcarbazepine (TRILEPTAL) 300 mg, Oral, 2 times daily    propranoloL (INDERAL) 10 mg, Oral, 2 times daily    traZODone (DESYREL) 50 MG tablet Take 50-100mg (1-2 tablets) by mouth nightly as need insomnia.             Assessment / Plan:    Cough, unspecified type  Tobacco dependence  Low likelihood for infectious process, consistent with smoker's cough  I have reviewed all his medications, none are know to have cough as an adverse effect  Recommended smoking cessation    Autism  -     Ambulatory referral/consult to Home Health; Future; Expected date: 12/13/2023   referral sent to [facility] per Mother's request          Follow up:    In  3 months, or earlier if needed.     Shira Mccrary MD  [employer] [facility] PGY-3

## 2023-12-14 ENCOUNTER — OFFICE VISIT (OUTPATIENT)
Dept: BEHAVIORAL HEALTH | Facility: CLINIC | Age: 23
End: 2023-12-14
Payer: MEDICAID

## 2023-12-14 VITALS
BODY MASS INDEX: 35.91 KG/M2 | SYSTOLIC BLOOD PRESSURE: 108 MMHG | WEIGHT: 202.69 LBS | DIASTOLIC BLOOD PRESSURE: 77 MMHG | HEART RATE: 88 BPM | TEMPERATURE: 98 F | OXYGEN SATURATION: 100 %

## 2023-12-14 DIAGNOSIS — F84.0 AUTISM: ICD-10-CM

## 2023-12-14 DIAGNOSIS — G25.71 NEUROLEPTIC-INDUCED ACUTE AKATHISIA: ICD-10-CM

## 2023-12-14 DIAGNOSIS — F29 PSYCHOSIS, UNSPECIFIED PSYCHOSIS TYPE: Primary | ICD-10-CM

## 2023-12-14 DIAGNOSIS — F91.3 OPPOSITIONAL DEFIANT DISORDER: ICD-10-CM

## 2023-12-14 DIAGNOSIS — T43.505A NEUROLEPTIC-INDUCED ACUTE AKATHISIA: ICD-10-CM

## 2023-12-14 PROCEDURE — 1160F PR REVIEW ALL MEDS BY PRESCRIBER/CLIN PHARMACIST DOCUMENTED: ICD-10-PCS | Mod: CPTII,,, | Performed by: STUDENT IN AN ORGANIZED HEALTH CARE EDUCATION/TRAINING PROGRAM

## 2023-12-14 PROCEDURE — 3074F SYST BP LT 130 MM HG: CPT | Mod: CPTII,,, | Performed by: STUDENT IN AN ORGANIZED HEALTH CARE EDUCATION/TRAINING PROGRAM

## 2023-12-14 PROCEDURE — 1159F PR MEDICATION LIST DOCUMENTED IN MEDICAL RECORD: ICD-10-PCS | Mod: CPTII,,, | Performed by: STUDENT IN AN ORGANIZED HEALTH CARE EDUCATION/TRAINING PROGRAM

## 2023-12-14 PROCEDURE — 3074F PR MOST RECENT SYSTOLIC BLOOD PRESSURE < 130 MM HG: ICD-10-PCS | Mod: CPTII,,, | Performed by: STUDENT IN AN ORGANIZED HEALTH CARE EDUCATION/TRAINING PROGRAM

## 2023-12-14 PROCEDURE — 1160F RVW MEDS BY RX/DR IN RCRD: CPT | Mod: CPTII,,, | Performed by: STUDENT IN AN ORGANIZED HEALTH CARE EDUCATION/TRAINING PROGRAM

## 2023-12-14 PROCEDURE — 99214 OFFICE O/P EST MOD 30 MIN: CPT | Mod: AF,HB,S$PBB, | Performed by: STUDENT IN AN ORGANIZED HEALTH CARE EDUCATION/TRAINING PROGRAM

## 2023-12-14 PROCEDURE — 3008F BODY MASS INDEX DOCD: CPT | Mod: CPTII,,, | Performed by: STUDENT IN AN ORGANIZED HEALTH CARE EDUCATION/TRAINING PROGRAM

## 2023-12-14 PROCEDURE — 99214 PR OFFICE/OUTPT VISIT, EST, LEVL IV, 30-39 MIN: ICD-10-PCS | Mod: AF,HB,S$PBB, | Performed by: STUDENT IN AN ORGANIZED HEALTH CARE EDUCATION/TRAINING PROGRAM

## 2023-12-14 PROCEDURE — 3078F PR MOST RECENT DIASTOLIC BLOOD PRESSURE < 80 MM HG: ICD-10-PCS | Mod: CPTII,,, | Performed by: STUDENT IN AN ORGANIZED HEALTH CARE EDUCATION/TRAINING PROGRAM

## 2023-12-14 PROCEDURE — 3078F DIAST BP <80 MM HG: CPT | Mod: CPTII,,, | Performed by: STUDENT IN AN ORGANIZED HEALTH CARE EDUCATION/TRAINING PROGRAM

## 2023-12-14 PROCEDURE — 99213 OFFICE O/P EST LOW 20 MIN: CPT | Mod: PBBFAC,PN | Performed by: STUDENT IN AN ORGANIZED HEALTH CARE EDUCATION/TRAINING PROGRAM

## 2023-12-14 PROCEDURE — 3008F PR BODY MASS INDEX (BMI) DOCUMENTED: ICD-10-PCS | Mod: CPTII,,, | Performed by: STUDENT IN AN ORGANIZED HEALTH CARE EDUCATION/TRAINING PROGRAM

## 2023-12-14 PROCEDURE — 1159F MED LIST DOCD IN RCRD: CPT | Mod: CPTII,,, | Performed by: STUDENT IN AN ORGANIZED HEALTH CARE EDUCATION/TRAINING PROGRAM

## 2023-12-14 NOTE — PROGRESS NOTES
"Outpatient Psychiatry Follow-Up Visit    12/14/2023    Clinical Status of Patient:  Outpatient (Ambulatory)    Chief Complaint:  Rod Siddiqui Jr. is a 23 y.o. male who presents today for follow-up of autism, ODD. Patient last seen for follow-up on 9/14/2023. Met with patient and pt's mother.      Interval History and Content of Current Session:  Interim Events/Subjective Report/Content of Current Session:   Pt's mother reports that pt has been doing "ok" since last visit.  Much fewer outbursts related to last visit.  Sleeping fair, occasional wakes up at night and goes outside to smoke.  Has been having coughing with post-tussive emesis.  Has been constipated.  Pt's mother brought up these concerns with pt's new PCP and was not given a satisfying answer (from pt's mother's perspective).  Has needed to use xanax very seldom since last visit.  Continues to pace "constantly."     Review of Systems   OSCAR 2/2 current mentation    Past Medical, Family and Social History: The patient's past medical, family and social history have been reviewed and updated as appropriate within the electronic medical record - see encounter notes.    Compliance: good    Side effects: denies    Risk Parameters:  Patient reports no suicidal ideation  Patient reports no homicidal ideation  Patient reports no self-injurious behavior  Patient reports no violent behavior    Exam (detailed: at least 9 elements; comprehensive: all 15 elements)   Constitutional  Vitals:  Most recent vital signs, dated less than 90 days prior to this appointment, were reviewed.     Vitals:    12/14/23 1518   BP: 108/77   Pulse: 88   Temp: 97.9 °F (36.6 °C)        General:   Constitutional: No acute distress, appears stated age, casually dressed    Neurologic:   Motor: moves all extremities spontaneously and without difficulty, no abnormal involuntary movements observed  Gait: normal gait and station    Mental status examination:   Appearance: appears stated age, " casually dressed, no acute distress  Behavior: non verbal  Mood: OSCAR  Affect: blunted  Thought process: non verbal  Associations: OSCAR  Thought content: OSCAR  Perceptions: OSCAR  Orientation: OSCAR  Language: no speech  Attention: OSCAR  Insight: OSCAR  Judgement: OSCAR    PHQ9:       No data to display                  5/25/2023     9:24 AM   GAD7   1. Feeling nervous, anxious, or on edge? 3   2. Not being able to stop or control worrying? 0   3. Worrying too much about different things? 0   4. Trouble relaxing? 1   5. Being so restless that it is hard to sit still? 2   6. Becoming easily annoyed or irritable? 2   7. Feeling afraid as if something awful might happen? 2   8. If you checked off any problems, how difficult have these problems made it for you to do your work, take care of things at home, or get along with other people? 1   OLVIN-7 Score 10     Assessment and Diagnosis   Status/Progress: Based on the examination today, the patient's problem(s) is/are adequately but not ideally controlled.  New problems have been presented today (constipation).   Co-morbidities and Lack of compliance are not complicating management of the primary condition.  Number of separate conditions addressed during today's visit: 2 (autism with behavior disturbance well controlled, anxiety fair control, pacing uncontrolled).  Are medication adjustments being made today: Yes.  Are referral(s) being ordered today: No.  Complexity (level) of medical decision making employed in the encounter: HIGH.    General Impression:    ICD-10-CM ICD-9-CM   1. Psychosis, unspecified psychosis type  F29 298.9   2. Oppositional defiant disorder  F91.3 313.81   3. Autism  F84.0 299.00   4. Neuroleptic-induced acute akathisia  G25.71 333.99    T43.505A E939.3     Intervention/Counseling/Treatment Plan   Continue thorazine 200mg bid  Increase propranolol to 20mg bid for akithisia  Continue trileptal 300mg bid  Continue trazodone 50-100mg nightly PRN  Continue xanax  0.5mg daily prn anxiety (none needed since last visit)  Recommended trial Miralax for constipation  No need for PEC as pt is not an imminent danger to self or others or gravely disabled due to acute psychiatric illness  Discussed that pt should either call clinic for psychiatric crisis symptoms or present to nearest emergency room    Discussed with patient informed consent including diagnosis, risks and benefits of proposed treatment above vs. alternative treatments vs. no treatment, as well as serious and common side effects of these treatments, and the inherent unpredictability of individual responses to these treatments. The patient expresses understanding of the above and displays the capacity to agree with this current plan. Patient also agrees that, currently, the benefits outweigh the risks and would like to pursue treatment at this time, and had no other questions.    Instructions:  Take all medications as prescribed.    Abstain from recreational drugs and alcohol.  Present to ED or call 911 for SI/HI plan or intent, psychosis, or medical emergency.    Return to Clinic: Follow up in about 3 months (around 3/14/2024).    Total time:   The total time for services performed on the date of the encounter (including review of prior visit notes, review of notes from other providers, review of results from laboratory/imaging studies, face-to-face time with patient, and time spent on other activities directly related to patient care): 25 minutes.    Manny Prater MD  [facility]

## 2023-12-15 ENCOUNTER — PATIENT MESSAGE (OUTPATIENT)
Dept: BEHAVIORAL HEALTH | Facility: CLINIC | Age: 23
End: 2023-12-15
Payer: MEDICAID

## 2023-12-15 RX ORDER — PROPRANOLOL HYDROCHLORIDE 20 MG/1
20 TABLET ORAL 2 TIMES DAILY
Qty: 60 TABLET | Refills: 5 | Status: SHIPPED | OUTPATIENT
Start: 2023-12-15 | End: 2024-03-13

## 2023-12-15 RX ORDER — OXCARBAZEPINE 300 MG/1
300 TABLET, FILM COATED ORAL 2 TIMES DAILY
Qty: 60 TABLET | Refills: 5 | Status: SHIPPED | OUTPATIENT
Start: 2023-12-15

## 2024-01-04 NOTE — PROGRESS NOTES
Faculty addendum: Patient discussed with resident. Chart was reviewed including vitals, labs, etc. Care provided reasonable and necessary. I participated in the management of the patient and was immediately available throughout the encounter. Services were furnished in a primary care center located in the outpatient department of a [facility] hospital. I agree with the resident's findings and plan as documented in the resident's note.

## 2024-01-11 ENCOUNTER — TELEPHONE (OUTPATIENT)
Dept: FAMILY MEDICINE | Facility: CLINIC | Age: 24
End: 2024-01-11
Payer: MEDICAID

## 2024-01-17 ENCOUNTER — TELEPHONE (OUTPATIENT)
Dept: FAMILY MEDICINE | Facility: CLINIC | Age: 24
End: 2024-01-17
Payer: MEDICAID

## 2024-01-17 ENCOUNTER — EXTERNAL HOME HEALTH (OUTPATIENT)
Dept: HOME HEALTH SERVICES | Facility: HOSPITAL | Age: 24
End: 2024-01-17
Payer: MEDICAID

## 2024-01-17 DIAGNOSIS — F51.04 PSYCHOPHYSIOLOGICAL INSOMNIA: Primary | ICD-10-CM

## 2024-01-17 RX ORDER — TRAZODONE HYDROCHLORIDE 100 MG/1
TABLET ORAL
Qty: 60 TABLET | Refills: 5 | Status: SHIPPED | OUTPATIENT
Start: 2024-01-17

## 2024-01-26 ENCOUNTER — DOCUMENT SCAN (OUTPATIENT)
Dept: HOME HEALTH SERVICES | Facility: HOSPITAL | Age: 24
End: 2024-01-26
Payer: MEDICAID

## 2024-01-29 NOTE — ADDENDUM NOTE
Addended by: WASHINGTON DUNLAP on: 1/29/2024 12:16 PM     Modules accepted: Orders, Level of Service

## 2024-03-13 ENCOUNTER — OFFICE VISIT (OUTPATIENT)
Dept: BEHAVIORAL HEALTH | Facility: CLINIC | Age: 24
End: 2024-03-13
Payer: MEDICAID

## 2024-03-13 DIAGNOSIS — R44.3 HALLUCINATIONS: ICD-10-CM

## 2024-03-13 DIAGNOSIS — F41.9 ANXIETY: ICD-10-CM

## 2024-03-13 DIAGNOSIS — F91.3 OPPOSITIONAL DEFIANT DISORDER: ICD-10-CM

## 2024-03-13 DIAGNOSIS — F84.0 AUTISM: ICD-10-CM

## 2024-03-13 DIAGNOSIS — F29 PSYCHOSIS, UNSPECIFIED PSYCHOSIS TYPE: Primary | ICD-10-CM

## 2024-03-13 PROCEDURE — 1160F RVW MEDS BY RX/DR IN RCRD: CPT | Mod: CPTII,95,, | Performed by: STUDENT IN AN ORGANIZED HEALTH CARE EDUCATION/TRAINING PROGRAM

## 2024-03-13 PROCEDURE — 1159F MED LIST DOCD IN RCRD: CPT | Mod: CPTII,95,, | Performed by: STUDENT IN AN ORGANIZED HEALTH CARE EDUCATION/TRAINING PROGRAM

## 2024-03-13 PROCEDURE — 99214 OFFICE O/P EST MOD 30 MIN: CPT | Mod: 95,AF,HB, | Performed by: STUDENT IN AN ORGANIZED HEALTH CARE EDUCATION/TRAINING PROGRAM

## 2024-03-13 RX ORDER — CLONAZEPAM 0.5 MG/1
0.5 TABLET ORAL DAILY
Qty: 30 TABLET | Refills: 1 | Status: SHIPPED | OUTPATIENT
Start: 2024-03-13 | End: 2024-03-25

## 2024-03-13 NOTE — PROGRESS NOTES
"Outpatient Psychiatry Follow-Up Visit    3/13/2024    Clinical Status of Patient:  Outpatient (Ambulatory)    Chief Complaint:  Rod Siddiqui Jr. is a 23 y.o. male who presents today for follow-up of autism, ODD. Patient last seen for follow-up on 12/14/2023. Met with patient and pt's mother.      TELE PSYCHIATRY Disclaimer   *The patient was informed despite using HIPPA compliant technology there may be risks including security breach, technological failure, inability to perform a comprehensive physical exam which could delay or prevent an accurate diagnosis, and potential complications from treatment decisions rendered over a telemedical platform.   The patient was also informed of the relationship between the physician and patient and the respective role of any other health care provider with respect to management of the patient; and notified that the pt may decline to receive medical services by telemedicine and may withdraw from such care at any time.      Patient's Current location: [address] [place] [address]    In Case of Emergency pts next of kin  Name:Alicia Sanders (mother)   Phone number:  [phone]  Visit type: Virtual visit with synchronous audio and video  Total time spent with patient: 30 minutes    Interval History and Content of Current Session:  Interim Events/Subjective Report/Content of Current Session:   Pt's mother reports pt "is still having episodes."  Notes episode refers to "screaming and hollering with whatever he's seeing."  Using xanax 2-3x weekly.  Had respiratory illness in February, required steroids for managing this.  Denies SI/HI/AVH/paranoia, denies plan or desire for self harm or harm to others.  No difficulty with sleeping noted. No change in pacing behavior, propranolol held while pt was on steroids (no change noted with stopping).  Good appetite.  No aggression or self harm behaviors. No SE noted. No somatic complaints noted.  Voices desire to change " pt's regimen to address ongoing symptoms.      Review of Systems   OSCAR 2/2 current mentation    Past Medical, Family and Social History: The patient's past medical, family and social history have been reviewed and updated as appropriate within the electronic medical record - see encounter notes.    Compliance: good    Side effects: denies    Risk Parameters:  Patient reports no suicidal ideation  Patient reports no homicidal ideation  Patient reports no self-injurious behavior  Patient reports no violent behavior    Exam (detailed: at least 9 elements; comprehensive: all 15 elements)   Constitutional  Vitals:  Most recent vital signs, dated less than 90 days prior to this appointment, were reviewed.     There were no vitals filed for this visit.       General:   Constitutional: No acute distress, appears stated age, casually dressed     Neurologic:   Motor: moves all extremities spontaneously and without difficulty, no abnormal involuntary movements observed  Gait: normal gait and station     Mental status examination:   Appearance: appears stated age, casually dressed, no acute distress  Behavior: non verbal, standing and watching mother's conversation with provider  Mood: OSCAR  Affect: blunted  Thought process: non verbal  Associations: OSCAR  Thought content: OSCAR  Perceptions: OSCAR  Orientation: OSCAR  Language: no speech  Attention: OSCAR  Insight: OSCAR  Judgement: OSCAR    PHQ9:       No data to display                  5/25/2023     9:24 AM   GAD7   1. Feeling nervous, anxious, or on edge? 3   2. Not being able to stop or control worrying? 0   3. Worrying too much about different things? 0   4. Trouble relaxing? 1   5. Being so restless that it is hard to sit still? 2   6. Becoming easily annoyed or irritable? 2   7. Feeling afraid as if something awful might happen? 2   8. If you checked off any problems, how difficult have these problems made it for you to do your work, take care of things at home, or get along with  other people? 1   OLVIN-7 Score 10     Assessment and Diagnosis   Status/Progress: Based on the examination today, the patient's problem(s) is/are inadequately controlled.  New problems have not been presented today.   Co-morbidities and Lack of compliance are not complicating management of the primary condition.  Number of separate conditions addressed during today's visit: 2 (psychosis uncontrolled, akithisia uncontrolled).  Are medication adjustments being made today: Yes.  Are referral(s) being ordered today: No.  Complexity (level) of medical decision making employed in the encounter: HIGH.    General Impression:    ICD-10-CM ICD-9-CM   1. Psychosis, unspecified psychosis type  F29 298.9   2. Oppositional defiant disorder  F91.3 313.81   3. Hallucinations  R44.3 780.1   4. Autism  F84.0 299.00   5. Anxiety  F41.9 300.00     Intervention/Counseling/Treatment Plan   Continue thorazine 200mg bid  Stop propranolol, pt's mother denies any benefit  Continue trileptal 300mg bid, consider downtitrating/stopping at next visit  Start klonopin 0.5mg daily, Discussed potential SE including but not limited to addictive behavior, cognitive clouding, sedation, falls, memory impairment; discussed risks of life-threatening oversedation if taken with sedating substances (including alcohol, prescription medications, recreational substances, etc.)  Continue trazodone 50-100mg nightly PRN  Continue xanax 0.5mg daily prn anxiety, pt's mother giving 2-3x weekly  No need for PEC as pt is not an imminent danger to self or others or gravely disabled due to acute psychiatric illness  Discussed that pt should either call clinic for psychiatric crisis symptoms or present to nearest emergency room    Discussed with patient informed consent including diagnosis, risks and benefits of proposed treatment above vs. alternative treatments vs. no treatment, as well as serious and common side effects of these treatments, and the inherent  unpredictability of individual responses to these treatments. The patient expresses understanding of the above and displays the capacity to agree with this current plan. Patient also agrees that, currently, the benefits outweigh the risks and would like to pursue treatment at this time, and had no other questions.    Instructions:  Take all medications as prescribed.    Abstain from recreational drugs and alcohol.  Present to ED or call 911 for SI/HI plan or intent, psychosis, or medical emergency.    Return to Clinic: Follow up in about 6 weeks (around 4/24/2024).    Total time:   The total time for services performed on the date of the encounter (including review of prior visit notes, review of notes from other providers, review of results from laboratory/imaging studies, face-to-face time with patient, and time spent on other activities directly related to patient care): 30 minutes.    Manny Prater MD  [facility]

## 2024-03-15 ENCOUNTER — PATIENT MESSAGE (OUTPATIENT)
Dept: BEHAVIORAL HEALTH | Facility: CLINIC | Age: 24
End: 2024-03-15
Payer: MEDICAID

## 2024-03-22 NOTE — TELEPHONE ENCOUNTER
Spoke to patient's mother by phone.  She reports the patient had episode of staring and having difficulty breathing.  Denied any history of such episodes in the past.  Voiced concern about patient possibly choking or having a seizure.  Patient is at baseline physically now.  Recommended that patient's mother reach out to patient's PCP for advice on workup/treatment.  Recommended stopping clonazepam for now.  Anticipate some increased need for Xanax, discussed this with patient's mother.  All questions answered.

## 2024-03-25 ENCOUNTER — OFFICE VISIT (OUTPATIENT)
Dept: FAMILY MEDICINE | Facility: CLINIC | Age: 24
End: 2024-03-25
Payer: MEDICAID

## 2024-03-25 VITALS
TEMPERATURE: 98 F | SYSTOLIC BLOOD PRESSURE: 121 MMHG | WEIGHT: 193.63 LBS | HEIGHT: 63 IN | OXYGEN SATURATION: 96 % | HEART RATE: 95 BPM | RESPIRATION RATE: 18 BRPM | DIASTOLIC BLOOD PRESSURE: 85 MMHG | BODY MASS INDEX: 34.31 KG/M2

## 2024-03-25 DIAGNOSIS — R56.9 WITNESSED SEIZURE-LIKE ACTIVITY: Primary | ICD-10-CM

## 2024-03-25 DIAGNOSIS — F17.200 TOBACCO DEPENDENCE: ICD-10-CM

## 2024-03-25 LAB
ALBUMIN SERPL-MCNC: 4.5 G/DL (ref 3.5–5)
ALBUMIN/GLOB SERPL: 1.3 RATIO (ref 1.1–2)
ALP SERPL-CCNC: 72 UNIT/L (ref 40–150)
ALT SERPL-CCNC: 55 UNIT/L (ref 0–55)
AST SERPL-CCNC: 23 UNIT/L (ref 5–34)
BASOPHILS # BLD AUTO: 0.08 X10(3)/MCL
BASOPHILS NFR BLD AUTO: 0.9 %
BILIRUB SERPL-MCNC: 0.3 MG/DL
BUN SERPL-MCNC: 14.4 MG/DL (ref 8.9–20.6)
CALCIUM SERPL-MCNC: 10.5 MG/DL (ref 8.4–10.2)
CHLORIDE SERPL-SCNC: 105 MMOL/L (ref 98–107)
CHOLEST SERPL-MCNC: 194 MG/DL
CHOLEST/HDLC SERPL: 6 {RATIO} (ref 0–5)
CO2 SERPL-SCNC: 26 MMOL/L (ref 22–29)
CREAT SERPL-MCNC: 1.14 MG/DL (ref 0.73–1.18)
EOSINOPHIL # BLD AUTO: 0.43 X10(3)/MCL (ref 0–0.9)
EOSINOPHIL NFR BLD AUTO: 5 %
ERYTHROCYTE [DISTWIDTH] IN BLOOD BY AUTOMATED COUNT: 13.2 % (ref 11.5–17)
FOLATE SERPL-MCNC: 3.6 NG/ML (ref 7–31.4)
GFR SERPLBLD CREATININE-BSD FMLA CKD-EPI: >60 MLS/MIN/1.73/M2
GLOBULIN SER-MCNC: 3.4 GM/DL (ref 2.4–3.5)
GLUCOSE SERPL-MCNC: 92 MG/DL (ref 74–100)
HCT VFR BLD AUTO: 47.3 % (ref 42–52)
HDLC SERPL-MCNC: 34 MG/DL (ref 35–60)
HGB BLD-MCNC: 16.1 G/DL (ref 14–18)
IMM GRANULOCYTES # BLD AUTO: 0.02 X10(3)/MCL (ref 0–0.04)
IMM GRANULOCYTES NFR BLD AUTO: 0.2 %
LDLC SERPL CALC-MCNC: 98 MG/DL (ref 50–140)
LYMPHOCYTES # BLD AUTO: 2.47 X10(3)/MCL (ref 0.6–4.6)
LYMPHOCYTES NFR BLD AUTO: 28.8 %
MCH RBC QN AUTO: 30.7 PG (ref 27–31)
MCHC RBC AUTO-ENTMCNC: 34 G/DL (ref 33–36)
MCV RBC AUTO: 90.1 FL (ref 80–94)
MONOCYTES # BLD AUTO: 0.69 X10(3)/MCL (ref 0.1–1.3)
MONOCYTES NFR BLD AUTO: 8.1 %
NEUTROPHILS # BLD AUTO: 4.88 X10(3)/MCL (ref 2.1–9.2)
NEUTROPHILS NFR BLD AUTO: 57 %
NRBC BLD AUTO-RTO: 0 %
PLATELET # BLD AUTO: 307 X10(3)/MCL (ref 130–400)
PMV BLD AUTO: 9.6 FL (ref 7.4–10.4)
POTASSIUM SERPL-SCNC: 4.2 MMOL/L (ref 3.5–5.1)
PROT SERPL-MCNC: 7.9 GM/DL (ref 6.4–8.3)
RBC # BLD AUTO: 5.25 X10(6)/MCL (ref 4.7–6.1)
SODIUM SERPL-SCNC: 141 MMOL/L (ref 136–145)
T4 FREE SERPL-MCNC: 1 NG/DL (ref 0.7–1.48)
TRIGL SERPL-MCNC: 308 MG/DL (ref 34–140)
TSH SERPL-ACNC: 1.36 UIU/ML (ref 0.35–4.94)
VIT B12 SERPL-MCNC: 516 PG/ML (ref 213–816)
VLDLC SERPL CALC-MCNC: 62 MG/DL
WBC # SPEC AUTO: 8.57 X10(3)/MCL (ref 4.5–11.5)

## 2024-03-25 PROCEDURE — 99214 OFFICE O/P EST MOD 30 MIN: CPT | Mod: PBBFAC | Performed by: STUDENT IN AN ORGANIZED HEALTH CARE EDUCATION/TRAINING PROGRAM

## 2024-03-25 PROCEDURE — 84443 ASSAY THYROID STIM HORMONE: CPT | Performed by: STUDENT IN AN ORGANIZED HEALTH CARE EDUCATION/TRAINING PROGRAM

## 2024-03-25 PROCEDURE — 84439 ASSAY OF FREE THYROXINE: CPT | Performed by: STUDENT IN AN ORGANIZED HEALTH CARE EDUCATION/TRAINING PROGRAM

## 2024-03-25 PROCEDURE — 85025 COMPLETE CBC W/AUTO DIFF WBC: CPT | Performed by: STUDENT IN AN ORGANIZED HEALTH CARE EDUCATION/TRAINING PROGRAM

## 2024-03-25 PROCEDURE — 80061 LIPID PANEL: CPT | Performed by: STUDENT IN AN ORGANIZED HEALTH CARE EDUCATION/TRAINING PROGRAM

## 2024-03-25 PROCEDURE — 80053 COMPREHEN METABOLIC PANEL: CPT | Performed by: STUDENT IN AN ORGANIZED HEALTH CARE EDUCATION/TRAINING PROGRAM

## 2024-03-25 PROCEDURE — 82746 ASSAY OF FOLIC ACID SERUM: CPT | Performed by: STUDENT IN AN ORGANIZED HEALTH CARE EDUCATION/TRAINING PROGRAM

## 2024-03-25 PROCEDURE — 82607 VITAMIN B-12: CPT | Performed by: STUDENT IN AN ORGANIZED HEALTH CARE EDUCATION/TRAINING PROGRAM

## 2024-03-25 PROCEDURE — 36415 COLL VENOUS BLD VENIPUNCTURE: CPT | Performed by: STUDENT IN AN ORGANIZED HEALTH CARE EDUCATION/TRAINING PROGRAM

## 2024-03-25 NOTE — PROGRESS NOTES
Chief Complaint  Follow-up (Dr. Prater d/c pt's propranolol and put him on clonazepam 0.5mg instead, but pt has been having issues with this medication since he has started it)      History of Present Illness  Rod Siddiqui Jr. is a 23 y.o. male presents to the clinic accompanied by mother and sitter for evaluation for with concern for seizure activity; last seen 12/12/2023    History obtained from mother due to medical condition    Mother reports Thursday 3/21/2024 seizure like activity described as eye bulging, face red and foaming at th mouth with bilateral hands shaking uncontrollably for approx 3 mins after discontinuation of propanolol and started clonazepam approx 2 weeks ago. 2 weeks ago woke up at night gasping for air for approx 1-2mins. No fecal or urine incontinence, no tongue biting. Denies post ictal phase, returned to sleep afterward. Hasn't received morning meds Discontinued clonazepam on 3/15/2024 at the instructions of Dr. Prater    Mother report history of petit mal seizure in childhood  EEG confirmed  grand mal at age of 11 yo her at [facility], none since then    URI beginning of feb seen at  received medrol dose arianne  Continues to smoke a cigarette every hour    Review of Systems   Constitutional:  Negative for fever.   Respiratory:  Negative for shortness of breath and wheezing.    Cardiovascular:  Negative for chest pain.   Gastrointestinal:  Negative for abdominal pain, diarrhea, nausea and vomiting.   Genitourinary:  Negative for hematuria.   Neurological:  Negative for headaches.         Physical Exam    Vitals:    03/25/24 0813   BP: 121/85   Pulse: 95   Resp: 18   Temp: 98 °F (36.7 °C)     Wt Readings from Last 2 Encounters:   03/25/24 87.8 kg (193 lb 9.6 oz)   12/14/23 91.9 kg (202 lb 11.2 oz)     Gen: NAD  HEENT: no nystagmus. nasal turbinates not inflamed, no drainage. MMM, no oropharyngeal erythema/exudates. TM clear without evidence of effusion, erythema, or bulging, +light reflex   Pulm:  Nonlabored, CTABL  CV: RRR, no MRG  MSK: no gait abnormalities  Neuro: Limited exam due to medical condition, PERRL, EOMI, CN grossly intact, no focal weakness, no slurred speech      Current Outpatient Medications  Current Outpatient Medications   Medication Instructions    ALPRAZolam (XANAX) 0.5 mg, Oral, 2 times daily PRN    chlorproMAZINE (THORAZINE) 200 mg, Oral, 2 times daily    clonazePAM (KLONOPIN) 0.5 mg, Oral, Daily    OXcarbazepine (TRILEPTAL) 300 mg, Oral, 2 times daily    traZODone (DESYREL) 100 MG tablet Take 100-200mg (1-2 tablets) by mouth nightly as need insomnia.             Assessment / Plan:    Witnessed seizure-like activity  May be a medication advese reaction as no occurrence upon discontinuation. Discontinue Clonazepam  ED precautions provided  Keep all scheduled f/u with Dr. Prater   -     EEG; Future  -     CBC Auto Differential; Future; Expected date: 03/25/2024  -     Comprehensive Metabolic Panel; Future; Expected date: 03/25/2024  -     Lipid Panel; Future; Expected date: 03/25/2024  -     TSH; Future; Expected date: 03/25/2024  -     T4, Free; Future; Expected date: 03/25/2024  -     Vitamin B12; Future; Expected date: 03/25/2024  -     Folate; Future; Expected date: 03/25/2024  -     Ambulatory referral/consult to Neurology; Future; Expected date: 04/01/2024    Tobacco dependence  Encouraged weaning and eventually smoking cessation to improve overall health    Follow up:    In 6 weeks, or earlier if needed. seizure    Shira MD Demetra  [employer] [facility] PGY-3

## 2024-03-27 ENCOUNTER — TELEPHONE (OUTPATIENT)
Dept: BEHAVIORAL HEALTH | Facility: CLINIC | Age: 24
End: 2024-03-27
Payer: MEDICAID

## 2024-03-27 ENCOUNTER — PATIENT MESSAGE (OUTPATIENT)
Dept: FAMILY MEDICINE | Facility: CLINIC | Age: 24
End: 2024-03-27
Payer: MEDICAID

## 2024-03-27 DIAGNOSIS — F29 PSYCHOSIS, UNSPECIFIED PSYCHOSIS TYPE: ICD-10-CM

## 2024-03-27 DIAGNOSIS — F41.9 ANXIETY: ICD-10-CM

## 2024-03-27 DIAGNOSIS — F91.3 OPPOSITIONAL DEFIANT DISORDER: Primary | ICD-10-CM

## 2024-03-27 RX ORDER — LORAZEPAM 0.5 MG/1
0.5 TABLET ORAL 2 TIMES DAILY PRN
Qty: 20 TABLET | Refills: 0 | Status: SHIPPED | OUTPATIENT
Start: 2024-03-27

## 2024-03-27 RX ORDER — DIVALPROEX SODIUM 500 MG/1
500 TABLET, FILM COATED, EXTENDED RELEASE ORAL 2 TIMES DAILY
Qty: 60 TABLET | Refills: 5 | Status: SHIPPED | OUTPATIENT
Start: 2024-03-27

## 2024-03-27 NOTE — TELEPHONE ENCOUNTER
Patient's mother by telephone.  Confirmed details noted in MA note.  She voiced desire to make medication change.  Unclear etiology, we will need to expand differential as patient has not demonstrated satisfying response to neuroleptic regimen.  We will trial valproic acid.  We will start with Depakote  mg b.i.d. will switch Xanax to Ativan 0.5 mg bid p.r.n.  patient's mother provided informed consent for these medication changes.  Discussed need for periodic lab monitoring for valproic acid, will investigate options for blood draws for patient.  All questions answered.

## 2024-03-27 NOTE — TELEPHONE ENCOUNTER
"Pt's mother called stating the Xanax does not help with yelling, or symptoms he's experiencing, it just puts him to sleep when he takes 0.5mg. If pt takes half (0.25mg), it doesn't last long enough before pt is needing to take another dose. Pt's mother also expressed that pt woke up tearfully this morning, seemed paranoid, was looking around the house as if he was trying to search for something or see someone. When asked what the pt was doing or seeing he only replied with "shhhhh" over and over. Mother reports the behavior lasted for an hour and then he seemed fine after. Mother is concerned symptoms are coming back and is worried that pt will decline over the Easter holiday if not addressed. She is requesting a call back today from provider to discuss possible medication options, in hopes of getting pt on new medication before Friday due to clinic and pharmacy being closed.    "

## 2024-05-06 DIAGNOSIS — R44.3 HALLUCINATIONS: Primary | ICD-10-CM

## 2024-05-06 RX ORDER — CHLORPROMAZINE HYDROCHLORIDE 200 MG/1
200 TABLET, FILM COATED ORAL 2 TIMES DAILY
Qty: 60 TABLET | Refills: 5 | Status: SHIPPED | OUTPATIENT
Start: 2024-05-06 | End: 2025-05-06

## 2024-05-31 ENCOUNTER — OFFICE VISIT (OUTPATIENT)
Dept: FAMILY MEDICINE | Facility: CLINIC | Age: 24
End: 2024-05-31
Payer: MEDICAID

## 2024-05-31 VITALS — BODY MASS INDEX: 34.29 KG/M2 | HEIGHT: 63 IN

## 2024-05-31 DIAGNOSIS — R56.9 SEIZURE: ICD-10-CM

## 2024-05-31 DIAGNOSIS — E53.8 FOLIC ACID DEFICIENCY: ICD-10-CM

## 2024-05-31 DIAGNOSIS — E78.1 HYPERTRIGLYCERIDEMIA: ICD-10-CM

## 2024-05-31 DIAGNOSIS — F84.0 AUTISM: ICD-10-CM

## 2024-05-31 DIAGNOSIS — T50.905D MEDICATION SIDE EFFECT, SUBSEQUENT ENCOUNTER: Primary | ICD-10-CM

## 2024-05-31 RX ORDER — FOLIC ACID 1 MG/1
1 TABLET ORAL DAILY
Qty: 90 TABLET | Refills: 0 | Status: SHIPPED | OUTPATIENT
Start: 2024-05-31

## 2024-05-31 NOTE — PROGRESS NOTES
Chief Complaint  Follow-up (F/u after stopping clonipin)    The patient location is: Home, in [place]  The chief complaint leading to consultation is: seizure- like activity follow up    Visit type: audiovisual    Face to Face time with patient: 12  30 minutes of total time spent on the encounter, which includes face to face time and non-face to face time preparing to see the patient (eg, review of tests), Obtaining and/or reviewing separately obtained history, Documenting clinical information in the electronic or other health record, Independently interpreting results (not separately reported) and communicating results to the patient/family/caregiver, or Care coordination (not separately reported).       Each patient to whom he or she provides medical services by telemedicine is:  (1) informed of the relationship between the physician and patient and the respective role of any other health care provider with respect to management of the patient; and (2) notified that he or she may decline to receive medical services by telemedicine and may withdraw from such care at any time.    History of Present Illness  Rod Siddiqui Jr. is a 23 y.o. male presents to the clinic for follow up on seizure like activity; last seen 3/25/2024    History provided by Mother due to medical condition    Mother reports no further seizure like activity since discontinuation of Klonopin    EEG and appt with Neurology 10/2024    Requesting order for HH with STAT, previous company without Psychiatry services    Discussed lab results, Lipids non fasting        ROS  Answers submitted by the patient for this visit:  Review of Systems Questionnaire (Submitted on 5/31/2024)  activity change: No  unexpected weight change: No  rhinorrhea: No  trouble swallowing: No  visual disturbance: No  chest tightness: No  polyuria: No  difficulty urinating: No  joint swelling: No  arthralgias: No  confusion: No  dysphoric mood: No      Physical Exam    There  were no vitals filed for this visit.  Wt Readings from Last 2 Encounters:   03/25/24 87.8 kg (193 lb 9.6 oz)   12/14/23 91.9 kg (202 lb 11.2 oz)     General:Rod seen on camera, alert and pacing behind his Mother, nonverbal      Current Outpatient Medications  Current Outpatient Medications   Medication Instructions    chlorproMAZINE (THORAZINE) 200 mg, Oral, 2 times daily    divalproex ER (DEPAKOTE ER) 500 mg, Oral, 2 times daily    folic acid (FOLVITE) 1 mg, Oral, Daily    LORazepam (ATIVAN) 0.5 mg, Oral, 2 times daily PRN    OXcarbazepine (TRILEPTAL) 300 mg, Oral, 2 times daily    traZODone (DESYREL) 100 MG tablet Take 100-200mg (1-2 tablets) by mouth nightly as need insomnia.             Assessment / Plan:  1. Medication side effect, subsequent encounter    2. Seizure    3. Folic acid deficiency    4. Autism    5. Hypertriglyceridemia        - Added Klonopin to allergy list  - Keep Neurology and EEG appt  - Folic acid supplementation  - New orders for  services placed for Mother's choice, STAT. Previously worked for company  - Discussed diet and lifestyle modification. Will need repeat fasting lipid  - Medication list reconciled             Follow up:    In 4 months, or earlier if needed.     Shira Mccrary MD  [employer] [facility] PGY-3

## 2024-06-01 NOTE — PROGRESS NOTES
I reviewed History, PE, A/P and chart was reviewed.  Services provided in the outpatient department of a teaching facility, I was immediately available.  I agree with resident, care reasonable and appropriate.

## 2024-07-24 DIAGNOSIS — F84.0 AUTISM: ICD-10-CM

## 2024-07-24 DIAGNOSIS — F51.04 PSYCHOPHYSIOLOGICAL INSOMNIA: ICD-10-CM

## 2024-07-24 RX ORDER — TRAZODONE HYDROCHLORIDE 100 MG/1
TABLET ORAL
Qty: 60 TABLET | Refills: 5 | Status: SHIPPED | OUTPATIENT
Start: 2024-07-24

## 2024-07-24 RX ORDER — OXCARBAZEPINE 300 MG/1
300 TABLET, FILM COATED ORAL 2 TIMES DAILY
Qty: 60 TABLET | Refills: 5 | Status: SHIPPED | OUTPATIENT
Start: 2024-07-24

## 2024-07-24 NOTE — TELEPHONE ENCOUNTER
Please see attached refill request.    Next scheduled office visit: 8/29/2024.    Last office visit: 3/13/2024.     Thank you!          ----- Message from Brooke Sue sent at 7/24/2024  9:38 AM CDT -----  .Who Called: Rod Siddiqui Jr.    Refill or New Rx:Refill  RX Name and Strength:traZODone (DESYREL) 100 MG tablet   How is the patient currently taking it? (ex. 1XDay):Sig: Take 100-200mg (1-2 tablets) by mouth nightly as need insomnia.  Is this a 30 day or 90 day RX:30  Local or Mail Order:local  List of preferred pharmacies on file (remove unneeded): [address]  Ordering Provider: Manny Prater MD    Refill or New Rx:Refill  RX Name and Strength:OXcarbazepine (TRILEPTAL) 300 MG Tab   How is the patient currently taking it? (ex. 1XDay):Sig - Route: Take 1 tablet (300 mg total) by mouth 2 (two) times daily. - Oral  Is this a 30 day or 90 day RX:30  Local or Mail Order:local  List of preferred pharmacies on file (remove unneeded): [address]  [facility]      Preferred Method of Contact: Phone Call  Patient's Preferred Phone Number on File: [phone]   Best Call Back Number, if different:  Additional Information: caller is requesting a refill for the following.

## 2024-09-10 ENCOUNTER — OFFICE VISIT (OUTPATIENT)
Dept: BEHAVIORAL HEALTH | Facility: CLINIC | Age: 24
End: 2024-09-10
Payer: MEDICAID

## 2024-09-10 DIAGNOSIS — F91.3 OPPOSITIONAL DEFIANT DISORDER: ICD-10-CM

## 2024-09-10 DIAGNOSIS — F29 PSYCHOSIS, UNSPECIFIED PSYCHOSIS TYPE: Primary | ICD-10-CM

## 2024-09-10 DIAGNOSIS — R44.3 HALLUCINATIONS: ICD-10-CM

## 2024-09-10 DIAGNOSIS — F51.04 PSYCHOPHYSIOLOGICAL INSOMNIA: ICD-10-CM

## 2024-09-10 DIAGNOSIS — F41.9 ANXIETY: ICD-10-CM

## 2024-09-10 DIAGNOSIS — F84.0 AUTISM: ICD-10-CM

## 2024-09-10 PROCEDURE — 99443 PR PHYSICIAN TELEPHONE EVALUATION 21-30 MIN: CPT | Mod: 95,,, | Performed by: STUDENT IN AN ORGANIZED HEALTH CARE EDUCATION/TRAINING PROGRAM

## 2024-09-10 PROCEDURE — 1160F RVW MEDS BY RX/DR IN RCRD: CPT | Mod: CPTII,95,, | Performed by: STUDENT IN AN ORGANIZED HEALTH CARE EDUCATION/TRAINING PROGRAM

## 2024-09-10 PROCEDURE — 1159F MED LIST DOCD IN RCRD: CPT | Mod: CPTII,95,, | Performed by: STUDENT IN AN ORGANIZED HEALTH CARE EDUCATION/TRAINING PROGRAM

## 2024-09-10 RX ORDER — CHLORPROMAZINE HYDROCHLORIDE 100 MG/1
TABLET, FILM COATED ORAL
Qty: 30 TABLET | Refills: 5 | Status: SHIPPED | OUTPATIENT
Start: 2024-09-10

## 2024-09-10 RX ORDER — CHLORPROMAZINE HYDROCHLORIDE 200 MG/1
TABLET, FILM COATED ORAL
Qty: 60 TABLET | Refills: 5 | Status: SHIPPED | OUTPATIENT
Start: 2024-09-10

## 2024-09-10 RX ORDER — LORAZEPAM 0.5 MG/1
0.5 TABLET ORAL 2 TIMES DAILY PRN
Qty: 30 TABLET | Refills: 1 | Status: SHIPPED | OUTPATIENT
Start: 2024-09-10

## 2024-09-10 RX ORDER — OXCARBAZEPINE 300 MG/1
300 TABLET, FILM COATED ORAL 2 TIMES DAILY
Qty: 60 TABLET | Refills: 5 | Status: SHIPPED | OUTPATIENT
Start: 2024-09-10

## 2024-09-10 RX ORDER — DIVALPROEX SODIUM 500 MG/1
500 TABLET, FILM COATED, EXTENDED RELEASE ORAL 2 TIMES DAILY
Qty: 60 TABLET | Refills: 5 | Status: SHIPPED | OUTPATIENT
Start: 2024-09-10

## 2024-09-10 RX ORDER — TRAZODONE HYDROCHLORIDE 100 MG/1
TABLET ORAL
Qty: 60 TABLET | Refills: 5 | Status: SHIPPED | OUTPATIENT
Start: 2024-09-10

## 2024-09-10 NOTE — PROGRESS NOTES
"Outpatient Psychiatry Follow-Up Visit    9/10/2024    Clinical Status of Patient:  Outpatient (Ambulatory)    Chief Complaint:  Rod Siddiqui Jr. is a 24 y.o. male who presents today for follow-up of autism, ODD. Patient last seen for follow-up on 3/13/2024. Met with patient and pt's mother.      TELE PSYCHIATRY Disclaimer   *The patient was informed despite using HIPPA compliant technology there may be risks including security breach, technological failure, inability to perform a comprehensive physical exam which could delay or prevent an accurate diagnosis, and potential complications from treatment decisions rendered over a telemedical platform.   The patient was also informed of the relationship between the physician and patient and the respective role of any other health care provider with respect to management of the patient; and notified that the pt may decline to receive medical services by telemedicine and may withdraw from such care at any time.      Patient's Current location: [address] [place] [address]    In Case of Emergency pts next of kin  Name:Alicia Sanders (mother)   Phone number:  [phone]  Visit type: Audio only  Total time spent with patient: 21 minutes    Interval History and Content of Current Session:  Interim Events/Subjective Report/Content of Current Session:   Pt's mother reports pt is doing "ok"  overall.  Continued hallucinations noted, behavioral problems fairly well controlled.  Continue pacing behavior noted.  Outbursts are well controlled with ativan.  Sleep "good," sleeping through the night.  Appetite high, weight increased.  Energy good.  Occasional irritability.  Denies SI/HI, denies plan or desire for self harm or harm to others. Denies SE from current regimen. Denies change in chronic somatic complaints. Pt voices desire to adjust regimen to address ongoing symptoms.      Review of Systems   OSCAR 2/2 current mentation    Past Medical, Family and Social " History: The patient's past medical, family and social history have been reviewed and updated as appropriate within the electronic medical record - see encounter notes.    Compliance: good    Side effects: denies    Risk Parameters:  Patient reports no suicidal ideation  Patient reports no homicidal ideation  Patient reports no self-injurious behavior  Patient reports no violent behavior    Exam (detailed: at least 9 elements; comprehensive: all 15 elements)   Constitutional  Vitals:  Most recent vital signs, dated less than 90 days prior to this appointment, were reviewed.     There were no vitals filed for this visit.       General:   Constitutional: No acute distress, appears stated age, casually dressed     Neurologic:   Motor: moves all extremities spontaneously and without difficulty, no abnormal involuntary movements observed  Gait: normal gait and station     Mental status examination:   Appearance: appears stated age, casually dressed, no acute distress  Behavior: non verbal, standing and watching mother's conversation with provider  Mood: OSCAR  Affect: blunted  Thought process: non verbal  Associations: OSCAR  Thought content: OSCAR  Perceptions: OSCAR  Orientation: OSCAR  Language: no speech  Attention: OSCAR  Insight: OSCAR  Judgement: OSCAR    PHQ9:       No data to display                  5/25/2023     9:24 AM   GAD7   1. Feeling nervous, anxious, or on edge? 3   2. Not being able to stop or control worrying? 0   3. Worrying too much about different things? 0   4. Trouble relaxing? 1   5. Being so restless that it is hard to sit still? 2   6. Becoming easily annoyed or irritable? 2   7. Feeling afraid as if something awful might happen? 2   8. If you checked off any problems, how difficult have these problems made it for you to do your work, take care of things at home, or get along with other people? 1   OLVIN-7 Score 10     Assessment and Diagnosis   Status/Progress: Based on the examination today, the patient's  problem(s) is/are adequately but not ideally controlled.  New problems have not been presented today.   Co-morbidities and Lack of compliance are not complicating management of the primary condition.  Number of separate conditions addressed during today's visit: 2 (hallucinations adequately but not ideally controlled, behavioral outbursts adequately but not ideally controlled).  Medication management: yes: Modifying an existing prescription, Refilling a prescription, and Deciding to continue a pre-existing prescription.  Are referral(s) being ordered today: No.  Complexity (level) of medical decision making employed in the encounter: HIGH.    General Impression:    ICD-10-CM ICD-9-CM   1. Psychosis, unspecified psychosis type  F29 298.9   2. Oppositional defiant disorder  F91.3 313.81   3. Hallucinations  R44.3 780.1   4. Autism  F84.0 299.00   5. Anxiety  F41.9 300.00   6. Psychophysiological insomnia  F51.04 307.42     Intervention/Counseling/Treatment Plan   Increase thorazine to 200 mg qam + 300mg nightly  Continue depakote 500mg bid  Continue trileptal 300mg bid  Continue ativan 0.5mg bid prn  Continue trazodone 50-100mg nightly PRN  Will investigate options for support resources  No need for PEC as pt is not an imminent danger to self or others or gravely disabled due to acute psychiatric illness  Discussed that pt should either call clinic for psychiatric crisis symptoms or present to nearest emergency room    Discussed with patient informed consent including diagnosis, risks and benefits of proposed treatment above vs. alternative treatments vs. no treatment, as well as serious and common side effects of these treatments, and the inherent unpredictability of individual responses to these treatments. The patient expresses understanding of the above and displays the capacity to agree with this current plan. Patient also agrees that, currently, the benefits outweigh the risks and would like to pursue treatment  at this time, and had no other questions.    Instructions:  Take all medications as prescribed.    Abstain from recreational drugs and alcohol.  Present to ED or call 911 for SI/HI plan or intent, psychosis, or medical emergency.    Return to Clinic: Follow up in about 3 months (around 12/10/2024).    Total time:   The total time for services performed on the date of the encounter (including review of prior visit notes, review of notes from other providers, review of results from laboratory/imaging studies, face-to-face time with patient, and time spent on other activities directly related to patient care): 21 minutes.    Manny Prater MD  [facility]

## 2024-11-20 ENCOUNTER — PATIENT MESSAGE (OUTPATIENT)
Dept: BEHAVIORAL HEALTH | Facility: CLINIC | Age: 24
End: 2024-11-20
Payer: MEDICAID

## 2024-11-20 ENCOUNTER — OFFICE VISIT (OUTPATIENT)
Dept: BEHAVIORAL HEALTH | Facility: CLINIC | Age: 24
End: 2024-11-20
Payer: MEDICAID

## 2024-11-20 ENCOUNTER — TELEPHONE (OUTPATIENT)
Dept: BEHAVIORAL HEALTH | Facility: CLINIC | Age: 24
End: 2024-11-20
Payer: MEDICAID

## 2024-11-20 DIAGNOSIS — F84.0 AUTISM: Primary | ICD-10-CM

## 2024-11-20 DIAGNOSIS — R44.3 HALLUCINATIONS: ICD-10-CM

## 2024-11-20 DIAGNOSIS — F29 PSYCHOSIS, UNSPECIFIED PSYCHOSIS TYPE: ICD-10-CM

## 2024-11-20 PROCEDURE — 1159F MED LIST DOCD IN RCRD: CPT | Mod: CPTII,95,, | Performed by: STUDENT IN AN ORGANIZED HEALTH CARE EDUCATION/TRAINING PROGRAM

## 2024-11-20 PROCEDURE — 1160F RVW MEDS BY RX/DR IN RCRD: CPT | Mod: CPTII,95,, | Performed by: STUDENT IN AN ORGANIZED HEALTH CARE EDUCATION/TRAINING PROGRAM

## 2024-11-20 PROCEDURE — 99214 OFFICE O/P EST MOD 30 MIN: CPT | Mod: AF,HB,95, | Performed by: STUDENT IN AN ORGANIZED HEALTH CARE EDUCATION/TRAINING PROGRAM

## 2024-11-20 NOTE — PROGRESS NOTES
Outpatient Psychiatry Follow-Up Visit    11/20/2024    Clinical Status of Patient:  Outpatient (Ambulatory)    Chief Complaint:  Rod Siddiqui Jr. is a 24 y.o. male who presents today for follow-up of autism, ODD. Patient last seen for follow-up on 9/10/2024. Met with patient and pt's mother.      TELE PSYCHIATRY Disclaimer   *The patient was informed despite using HIPPA compliant technology there may be risks including security breach, technological failure, inability to perform a comprehensive physical exam which could delay or prevent an accurate diagnosis, and potential complications from treatment decisions rendered over a telemedical platform.   The patient was also informed of the relationship between the physician and patient and the respective role of any other health care provider with respect to management of the patient; and notified that the pt may decline to receive medical services by telemedicine and may withdraw from such care at any time.     Patient's Current location: [address] [place] [address]    In Case of Emergency pts next of kin  Name:Alicia Sanders (mother)   Phone number:  [phone]  Visit type: Virtual visit with synchronous audio and video  Total time spent with patient: 30 minutes    Interval History and Content of Current Session:  Interim Events/Subjective Report/Content of Current Session:   Patient's mother called clinic earlier in the day to ask for advice from provider.  Due to availability in the schedule, patient is scheduled for virtual appointment.  Patient's mother reported that patient did not respond well to increase dose of Thorazine.  Notes that patient had worsened sleep, worsening behavior, an increased response to hallucinations.  She decreased dose of Thorazine down to prior regimen with return to prior baseline.  She notes that patient went to eat dinner with his father and did not have his medications.  Patient's behavior worsened and, on  recommendation friend of patient's father, patient was given cannabis in an effort to calm patient down.  Patient's mother spoke with patient's father about this with patient present.  Patient reported that is using cannabis caused him to feel relaxed and to experience decreased hallucinations.  Patient's mother was very surprised by this reaction.  She asks for advice from provider about how to proceed.    Overall, patient's mood has been fairly stable but with frequent episodes of irritability.  Continues to exhibit behaviors indicating anxiety.  Continues to experience hallucinations which he responds loudly to.  Sleep fair.  Has not engaged in self-harm behaviors, no violence noted.    Review of Systems   OSCAR 2/2 current mentation    Past Medical, Family and Social History: The patient's past medical, family and social history have been reviewed and updated as appropriate within the electronic medical record - see encounter notes.    Compliance: good    Side effects: denies    Risk Parameters:  Patient reports no suicidal ideation  Patient reports no homicidal ideation  Patient reports no self-injurious behavior  Patient reports no violent behavior    Exam (detailed: at least 9 elements; comprehensive: all 15 elements)   Constitutional  Vitals:  Most recent vital signs, dated less than 90 days prior to this appointment, were reviewed.     There were no vitals filed for this visit.       General:   Constitutional: No acute distress, appears stated age, casually dressed     Neurologic:   Motor: moves all extremities spontaneously and without difficulty, no abnormal involuntary movements observed  Gait: normal gait and station     Mental status examination:   Appearance: appears stated age, casually dressed, no acute distress  Behavior: non verbal, standing and watching mother's conversation with provider  Mood: OSCAR  Affect: blunted  Thought process: non verbal  Associations: OSCAR  Thought content: OSCAR  Perceptions:  OSCAR  Orientation: OSCAR  Language: no speech  Attention: [facility]  Insight: [facility]  Judgement: [facility]    PHQ9:       No data to display                  5/25/2023     9:24 AM   GAD7   1. Feeling nervous, anxious, or on edge? 3   2. Not being able to stop or control worrying? 0   3. Worrying too much about different things? 0   4. Trouble relaxing? 1   5. Being so restless that it is hard to sit still? 2   6. Becoming easily annoyed or irritable? 2   7. Feeling afraid as if something awful might happen? 2   8. If you checked off any problems, how difficult have these problems made it for you to do your work, take care of things at home, or get along with other people? 1   OLVIN-7 Score 10     Assessment and Diagnosis   Status/Progress: Based on the examination today, the patient's problem(s) is/are adequately but not ideally controlled.  New problems have not been presented today.   Co-morbidities and Lack of compliance are not complicating management of the primary condition.  Number of separate conditions addressed during today's visit: 2 (hallucinations adequately but not ideally controlled, behavioral outbursts adequately but not ideally controlled).  Medication management: yes: Modifying an existing prescription, Refilling a prescription, and Deciding to continue a pre-existing prescription.  Are referral(s) being ordered today: No.  Complexity (level) of medical decision making employed in the encounter: HIGH.    General Impression:    ICD-10-CM ICD-9-CM   1. Autism  F84.0 299.00   2. Psychosis, unspecified psychosis type  F29 298.9   3. Hallucinations  R44.3 780.1     Intervention/Counseling/Treatment Plan   Decrease thorazine back to 200 mg bid  Continue depakote 500mg bid  Continue trileptal 300mg bid  Continue ativan 0.5mg bid prn  Continue trazodone 50-100mg nightly PRN  Discussed that cannabis is not currently considered a treatment for any particular psychiatric disorder, recommend against use of THC containing  products  Discussed that patient's mother can trial CBD but encouraged caution due to likely unpredictable response  If patient does not respond to CBD, and beneficial reaction is likely due to other psychoactive components of cannabis (likely THC), we will discuss how to proceed with patient's mother  Provided recommendations (portal message) about appropriate dose of CBD products   No need for PEC as pt is not an imminent danger to self or others or gravely disabled due to acute psychiatric illness  Discussed that pt should either call clinic for psychiatric crisis symptoms or present to nearest emergency room    Discussed with patient informed consent including diagnosis, risks and benefits of proposed treatment above vs. alternative treatments vs. no treatment, as well as serious and common side effects of these treatments, and the inherent unpredictability of individual responses to these treatments. The patient expresses understanding of the above and displays the capacity to agree with this current plan. Patient also agrees that, currently, the benefits outweigh the risks and would like to pursue treatment at this time, and had no other questions.    Instructions:  Take all medications as prescribed.    Abstain from recreational drugs and alcohol.  Present to ED or call 911 for SI/HI plan or intent, psychosis, or medical emergency.    Return to Clinic: Follow up in about 3 months (around 2/20/2025).    Total time:   The total time for services performed on the date of the encounter (including review of prior visit notes, review of notes from other providers, review of results from laboratory/imaging studies, face-to-face time with patient, and time spent on other activities directly related to patient care): 30 minutes.    Manny Prater MD  [facility]

## 2024-11-20 NOTE — TELEPHONE ENCOUNTER
Spoke to patients mother  She stated the Thorazine had wiley increased to 300 mg at night but after the increase his symptoms got worse.  He became more agitated and talked to the air more  She stopped the 300 mg of Thorazine at night, went back to 200 mg and he was a little better    Went to spend the night with his dad and smoked some marijuana.  Rod said it made him calm  Mom said she had never seen him like this  He was not agitated, was not talking out loud and he was calmer than he had been in a while.  She said, she is against the use of marijuana, but ir really worked  Mother was requesting to speak to Dr. Prater.  I informed her that I would forward him the message.

## 2024-12-26 DIAGNOSIS — F29 PSYCHOSIS, UNSPECIFIED PSYCHOSIS TYPE: ICD-10-CM

## 2024-12-26 DIAGNOSIS — F41.9 ANXIETY: ICD-10-CM

## 2024-12-26 NOTE — TELEPHONE ENCOUNTER
Please see attached refill request.    Next scheduled office visit: 02/24/2025   Last office visit: 11/20/24    Thank you!

## 2024-12-30 RX ORDER — LORAZEPAM 0.5 MG/1
0.5 TABLET ORAL 2 TIMES DAILY PRN
Qty: 30 TABLET | Refills: 2 | Status: SHIPPED | OUTPATIENT
Start: 2024-12-30

## 2024-12-30 NOTE — TELEPHONE ENCOUNTER
Received refill request for ativan.  PDMP reviewed and demonstrated no abnormal filling patterns.  Refill submitted as requested.  Will follow up with patient as currently scheduled.

## 2025-01-05 ENCOUNTER — PATIENT MESSAGE (OUTPATIENT)
Dept: BEHAVIORAL HEALTH | Facility: CLINIC | Age: 25
End: 2025-01-05
Payer: MEDICAID

## 2025-01-06 ENCOUNTER — TELEPHONE (OUTPATIENT)
Dept: BEHAVIORAL HEALTH | Facility: CLINIC | Age: 25
End: 2025-01-06
Payer: MEDICAID

## 2025-01-06 NOTE — TELEPHONE ENCOUNTER
Unable to contact patient   There was no answer  To inform her that he has an appointment scheduled with Ms. Rincon  2/24/24 @ 12:00  Radha Avilez CMA  1/6/2025, 11:02 AM

## 2025-01-13 ENCOUNTER — TELEPHONE (OUTPATIENT)
Dept: BEHAVIORAL HEALTH | Facility: CLINIC | Age: 25
End: 2025-01-13
Payer: MEDICAID

## 2025-01-13 NOTE — TELEPHONE ENCOUNTER
Spoke with mother regarding requested letter. Emailed letter to mother's work email. No other concerns at this time.

## 2025-02-24 ENCOUNTER — PATIENT MESSAGE (OUTPATIENT)
Dept: BEHAVIORAL HEALTH | Facility: CLINIC | Age: 25
End: 2025-02-24
Payer: MEDICAID

## 2025-02-24 ENCOUNTER — OFFICE VISIT (OUTPATIENT)
Dept: BEHAVIORAL HEALTH | Facility: CLINIC | Age: 25
End: 2025-02-24
Payer: MEDICAID

## 2025-02-24 DIAGNOSIS — F91.3 OPPOSITIONAL DEFIANT DISORDER: ICD-10-CM

## 2025-02-24 DIAGNOSIS — R44.3 HALLUCINATIONS: ICD-10-CM

## 2025-02-24 DIAGNOSIS — F29 PSYCHOSIS, UNSPECIFIED PSYCHOSIS TYPE: ICD-10-CM

## 2025-02-24 DIAGNOSIS — F84.0 AUTISM: Primary | ICD-10-CM

## 2025-02-24 PROCEDURE — 1159F MED LIST DOCD IN RCRD: CPT | Mod: CPTII,95,, | Performed by: STUDENT IN AN ORGANIZED HEALTH CARE EDUCATION/TRAINING PROGRAM

## 2025-02-24 PROCEDURE — 1160F RVW MEDS BY RX/DR IN RCRD: CPT | Mod: CPTII,95,, | Performed by: STUDENT IN AN ORGANIZED HEALTH CARE EDUCATION/TRAINING PROGRAM

## 2025-02-24 PROCEDURE — 98006 SYNCH AUDIO-VIDEO EST MOD 30: CPT | Mod: 95,,, | Performed by: STUDENT IN AN ORGANIZED HEALTH CARE EDUCATION/TRAINING PROGRAM

## 2025-02-24 RX ORDER — CHLORPROMAZINE HYDROCHLORIDE 100 MG/1
TABLET, FILM COATED ORAL
Qty: 21 TABLET | Refills: 0 | Status: SHIPPED | OUTPATIENT
Start: 2025-02-24

## 2025-02-24 RX ORDER — HALOPERIDOL 5 MG/1
TABLET ORAL
Qty: 90 TABLET | Refills: 1 | Status: SHIPPED | OUTPATIENT
Start: 2025-02-24

## 2025-02-24 NOTE — PROGRESS NOTES
"Outpatient Psychiatry Follow-Up Visit    2/24/2025    Clinical Status of Patient:  Outpatient (Ambulatory)    Chief Complaint:  Rod Siddiqui Jr. is a 24 y.o. male who presents today for follow-up of autism, ODD. Patient last seen for follow-up on 11/20/2024. Met with patient and pt's mother.      TELE PSYCHIATRY Disclaimer   *The patient was informed despite using HIPPA compliant technology there may be risks including security breach, technological failure, inability to perform a comprehensive physical exam which could delay or prevent an accurate diagnosis, and potential complications from treatment decisions rendered over a telemedical platform.   The patient was also informed of the relationship between the physician and patient and the respective role of any other health care provider with respect to management of the patient; and notified that the pt may decline to receive medical services by telemedicine and may withdraw from such care at any time.     Patient's Current location: [address] [place] [address]    In Case of Emergency pts next of kin  Name:Alicia Sanders (mother)   Phone number:  [phone]  Visit type: Virtual visit with synchronous audio and video  Total time spent with patient: 30 minutes    Interval History and Content of Current Session:  Interim Events/Subjective Report/Content of Current Session:   Pt reports doing "not good"  overall.  Reports that pt's behavior has been deteriorating and medication do not appear to be working.  Continues to struggle with hallucinations, paces about when upset.  Diminished response to lorazepam over time.  No violence towards self or others.  Eating and sleeping without difficult.  Pt's mother requests medication adjustment to address ongoing symptoms.    Review of Systems   [facility] 2/2 current mentation    Past Medical, Family and Social History: The patient's past medical, family and social history have been reviewed and updated as " appropriate within the electronic medical record - see encounter notes.    Compliance: good    Side effects: denies    Risk Parameters:  Patient reports no suicidal ideation  Patient reports no homicidal ideation  Patient reports no self-injurious behavior  Patient reports no violent behavior    Exam (detailed: at least 9 elements; comprehensive: all 15 elements)   Constitutional  Vitals:  Most recent vital signs, dated less than 90 days prior to this appointment, were reviewed.     There were no vitals filed for this visit.       General:   Constitutional: No acute distress, appears stated age, casually dressed     Neurologic:   Motor: moves all extremities spontaneously and without difficulty, no abnormal involuntary movements observed  Gait: normal gait and station     Mental status examination:   Appearance: appears stated age, casually dressed, no acute distress  Behavior: non verbal, does not attempt to participate with interaction  Mood: OSCAR  Affect: blunted  Thought process: non verbal  Associations: OSCAR  Thought content: OSCAR  Perceptions: OSCAR  Orientation: OSCAR  Language: no speech  Attention: OSCAR  Insight: OSCAR  Judgement: OSCAR    PHQ9:       No data to display                  5/25/2023     9:24 AM   GAD7   1. Feeling nervous, anxious, or on edge? 3   2. Not being able to stop or control worrying? 0   3. Worrying too much about different things? 0   4. Trouble relaxing? 1   5. Being so restless that it is hard to sit still? 2   6. Becoming easily annoyed or irritable? 2   7. Feeling afraid as if something awful might happen? 2   8. If you checked off any problems, how difficult have these problems made it for you to do your work, take care of things at home, or get along with other people? 1   OLVIN-7 Score 10     Assessment and Diagnosis   Status/Progress: Based on the examination today, the patient's problem(s) is/are adequately but not ideally controlled.  New problems have not been presented today.    Co-morbidities and Lack of compliance are not complicating management of the primary condition.  Number of separate conditions addressed during today's visit: 2 (hallucinations adequately but not ideally controlled, behavioral outbursts adequately but not ideally controlled).  Medication management: yes: Modifying an existing prescription, Refilling a prescription, and Deciding to continue a pre-existing prescription.  Are referral(s) being ordered today: No.  Complexity (level) of medical decision making employed in the encounter: HIGH.    General Impression:    ICD-10-CM ICD-9-CM   1. Autism  F84.0 299.00   2. Psychosis, unspecified psychosis type  F29 298.9   3. Oppositional defiant disorder  F91.3 313.81   4. Hallucinations  R44.3 780.1     Intervention/Counseling/Treatment Plan   Will cross titrate from thorazine to haldol as follows:   Stop morning chlorpromazine, start haldol 5mg qam, continue thorazine 200mg nightly  Increase haldol to 5mg bid, decrease thorazine nightly to 100mg  Change haldol to 5mg qam + 10mg nightly, stop thorazine  Continue depakote 500mg bid  Continue trileptal 300mg bid  Continue ativan 0.5mg bid prn, will likely need to use more frequently during antipsychotic cross titration  Continue trazodone 50-100mg nightly PRN  No need for PEC as pt is not an imminent danger to self or others or gravely disabled due to acute psychiatric illness  Discussed that pt should either call clinic for psychiatric crisis symptoms or present to nearest emergency room    Discussed with patient informed consent including diagnosis, risks and benefits of proposed treatment above vs. alternative treatments vs. no treatment, as well as serious and common side effects of these treatments, and the inherent unpredictability of individual responses to these treatments. The patient expresses understanding of the above and displays the capacity to agree with this current plan. Patient also agrees that, currently,  the benefits outweigh the risks and would like to pursue treatment at this time, and had no other questions.    Instructions:  Take all medications as prescribed.    Abstain from recreational drugs and alcohol.  Present to ED or call 911 for SI/HI plan or intent, psychosis, or medical emergency.    Return to Clinic: Follow up in about 4 weeks (around 3/24/2025).    Total time:   The total time for services performed on the date of the encounter (including review of prior visit notes, review of notes from other providers, review of results from laboratory/imaging studies, face-to-face time with patient, and time spent on other activities directly related to patient care): 30 minutes.    Manny Prater MD  [facility]

## 2025-02-28 ENCOUNTER — LAB VISIT (OUTPATIENT)
Dept: LAB | Facility: HOSPITAL | Age: 25
End: 2025-02-28
Payer: MEDICAID

## 2025-02-28 ENCOUNTER — OFFICE VISIT (OUTPATIENT)
Dept: FAMILY MEDICINE | Facility: CLINIC | Age: 25
End: 2025-02-28
Payer: MEDICAID

## 2025-02-28 VITALS
HEIGHT: 63 IN | SYSTOLIC BLOOD PRESSURE: 110 MMHG | HEART RATE: 87 BPM | DIASTOLIC BLOOD PRESSURE: 70 MMHG | OXYGEN SATURATION: 99 % | BODY MASS INDEX: 33.29 KG/M2 | WEIGHT: 187.88 LBS | TEMPERATURE: 98 F

## 2025-02-28 DIAGNOSIS — F84.0 AUTISM: ICD-10-CM

## 2025-02-28 DIAGNOSIS — Z23 ENCOUNTER FOR ADMINISTRATION OF VACCINE: ICD-10-CM

## 2025-02-28 DIAGNOSIS — F84.0 AUTISM: Primary | ICD-10-CM

## 2025-02-28 LAB
ALBUMIN SERPL-MCNC: 4.3 G/DL (ref 3.5–5)
ALBUMIN/GLOB SERPL: 1.3 RATIO (ref 1.1–2)
ALP SERPL-CCNC: 47 UNIT/L (ref 40–150)
ALT SERPL-CCNC: 50 UNIT/L (ref 0–55)
ANION GAP SERPL CALC-SCNC: 6 MEQ/L
AST SERPL-CCNC: 24 UNIT/L (ref 5–34)
BASOPHILS # BLD AUTO: 0.03 X10(3)/MCL
BASOPHILS NFR BLD AUTO: 0.4 %
BILIRUB SERPL-MCNC: 0.3 MG/DL
BUN SERPL-MCNC: 15.5 MG/DL (ref 8.9–20.6)
CALCIUM SERPL-MCNC: 9.6 MG/DL (ref 8.4–10.2)
CHLORIDE SERPL-SCNC: 104 MMOL/L (ref 98–107)
CHOLEST SERPL-MCNC: 175 MG/DL
CHOLEST/HDLC SERPL: 6 {RATIO} (ref 0–5)
CO2 SERPL-SCNC: 27 MMOL/L (ref 22–29)
CREAT SERPL-MCNC: 0.84 MG/DL (ref 0.72–1.25)
CREAT/UREA NIT SERPL: 18
EOSINOPHIL # BLD AUTO: 0.35 X10(3)/MCL (ref 0–0.9)
EOSINOPHIL NFR BLD AUTO: 4.6 %
ERYTHROCYTE [DISTWIDTH] IN BLOOD BY AUTOMATED COUNT: 12.7 % (ref 11.5–17)
EST. AVERAGE GLUCOSE BLD GHB EST-MCNC: 91.1 MG/DL
FOLATE SERPL-MCNC: 5.9 NG/ML (ref 7–31.4)
GFR SERPLBLD CREATININE-BSD FMLA CKD-EPI: >60 ML/MIN/1.73/M2
GLOBULIN SER-MCNC: 3.3 GM/DL (ref 2.4–3.5)
GLUCOSE SERPL-MCNC: 88 MG/DL (ref 74–100)
HBA1C MFR BLD: 4.8 %
HCT VFR BLD AUTO: 44.5 % (ref 42–52)
HDLC SERPL-MCNC: 31 MG/DL (ref 35–60)
HGB BLD-MCNC: 15 G/DL (ref 14–18)
IMM GRANULOCYTES # BLD AUTO: 0.01 X10(3)/MCL (ref 0–0.04)
IMM GRANULOCYTES NFR BLD AUTO: 0.1 %
LDLC SERPL CALC-MCNC: 111 MG/DL (ref 50–140)
LYMPHOCYTES # BLD AUTO: 2.73 X10(3)/MCL (ref 0.6–4.6)
LYMPHOCYTES NFR BLD AUTO: 36.2 %
MCH RBC QN AUTO: 31 PG (ref 27–31)
MCHC RBC AUTO-ENTMCNC: 33.7 G/DL (ref 33–36)
MCV RBC AUTO: 91.9 FL (ref 80–94)
MONOCYTES # BLD AUTO: 0.47 X10(3)/MCL (ref 0.1–1.3)
MONOCYTES NFR BLD AUTO: 6.2 %
NEUTROPHILS # BLD AUTO: 3.96 X10(3)/MCL (ref 2.1–9.2)
NEUTROPHILS NFR BLD AUTO: 52.5 %
NRBC BLD AUTO-RTO: 0 %
PLATELET # BLD AUTO: 232 X10(3)/MCL (ref 130–400)
PMV BLD AUTO: 9.2 FL (ref 7.4–10.4)
POTASSIUM SERPL-SCNC: 4.5 MMOL/L (ref 3.5–5.1)
PROT SERPL-MCNC: 7.6 GM/DL (ref 6.4–8.3)
RBC # BLD AUTO: 4.84 X10(6)/MCL (ref 4.7–6.1)
SODIUM SERPL-SCNC: 137 MMOL/L (ref 136–145)
TRIGL SERPL-MCNC: 165 MG/DL (ref 34–140)
TSH SERPL-ACNC: 0.88 UIU/ML (ref 0.35–4.94)
VIT B12 SERPL-MCNC: 750 PG/ML (ref 213–816)
VLDLC SERPL CALC-MCNC: 33 MG/DL
WBC # BLD AUTO: 7.55 X10(3)/MCL (ref 4.5–11.5)

## 2025-02-28 PROCEDURE — 36415 COLL VENOUS BLD VENIPUNCTURE: CPT

## 2025-02-28 PROCEDURE — 80053 COMPREHEN METABOLIC PANEL: CPT

## 2025-02-28 PROCEDURE — 83036 HEMOGLOBIN GLYCOSYLATED A1C: CPT

## 2025-02-28 PROCEDURE — 90471 IMMUNIZATION ADMIN: CPT | Mod: PBBFAC

## 2025-02-28 PROCEDURE — 90715 TDAP VACCINE 7 YRS/> IM: CPT | Mod: PBBFAC

## 2025-02-28 PROCEDURE — 80061 LIPID PANEL: CPT

## 2025-02-28 PROCEDURE — 85025 COMPLETE CBC W/AUTO DIFF WBC: CPT

## 2025-02-28 PROCEDURE — 82746 ASSAY OF FOLIC ACID SERUM: CPT

## 2025-02-28 PROCEDURE — 99214 OFFICE O/P EST MOD 30 MIN: CPT | Mod: PBBFAC

## 2025-02-28 PROCEDURE — 84443 ASSAY THYROID STIM HORMONE: CPT

## 2025-02-28 PROCEDURE — 82607 VITAMIN B-12: CPT

## 2025-02-28 RX ORDER — POLYETHYLENE GLYCOL 3350 17 G/17G
17 POWDER, FOR SOLUTION ORAL DAILY
Qty: 90 EACH | Refills: 3 | Status: SHIPPED | OUTPATIENT
Start: 2025-02-28

## 2025-02-28 NOTE — PROGRESS NOTES
[facility] OFFICE VISIT NOTE  Rod Siddiqui Jr.  [id number]  03/01/2025    Chief Complaint   Patient presents with    Follow-up     Follow up on medchanges. Has forms to complete. No other complaints       Rod Siddiqui Jr. is a 24 y.o. male  presenting to [facility] for routine follow up.    Doing well today, no concerns. Seen by psych 2/24/2025, in process of transitioning medications due to having persistent hallucinations. Mom states has not yet transitioned, planning to start at the beginning of next week. Denies any recent seizures, last activity with administration of Klonopin, has not seen neurology.     Health maintenance  Colon Cancer: N/A  Lung Cancer: N/A    Immunizations  Tdap: Due now- will administer today  Pneumo: N/A  VZV: N/A    Provider Team  [facility]- Psych    Current Medications:   Current Outpatient Medications   Medication Instructions    chlorproMAZINE (THORAZINE) 100 MG tablet Take 200mg (2 tabs) by mouth nightly x7 days, then 100mg (1 tab) by mouth nightly x7 days, then stop.    divalproex ER (DEPAKOTE ER) 500 mg, Oral, 2 times daily    folic acid (FOLVITE) 1 mg, Oral, Daily    haloperidoL (HALDOL) 5 MG tablet Start 5mg by mouth daily x7 days, then increase to 5mg by mouth twice daily x7 days, then increase to 5mg by mouth daily + 10mg by mouth nightly thereafter    LORazepam (ATIVAN) 0.5 mg, Oral, 2 times daily PRN    OXcarbazepine (TRILEPTAL) 300 mg, Oral, 2 times daily    polyethylene glycol (GLYCOLAX) 17 g, Oral, Daily    traZODone (DESYREL) 100 MG tablet Take 100-200mg (1-2 tablets) by mouth nightly as need insomnia.     (As per patient prior to office visit)  Review of Systems   Constitutional:  Negative for activity change and unexpected weight change.   HENT:  Negative for hearing loss, rhinorrhea and trouble swallowing.    Eyes:  Negative for discharge and visual disturbance.   Respiratory:  Negative for chest tightness and wheezing.    Cardiovascular:  Negative for chest pain and  "palpitations.   Gastrointestinal:  Negative for blood in stool, constipation, diarrhea and vomiting.   Endocrine: Negative for polydipsia and polyuria.   Genitourinary:  Negative for difficulty urinating, hematuria and urgency.   Musculoskeletal:  Negative for arthralgias, joint swelling and neck pain.   Neurological:  Negative for weakness and headaches.   Psychiatric/Behavioral:  Negative for confusion and dysphoric mood.        Blood pressure 110/70, pulse 87, temperature 98 °F (36.7 °C), temperature source Oral, height 5' 2.99" (1.6 m), weight 85.2 kg (187 lb 14.4 oz), SpO2 99%.   Physical Exam  Vitals and nursing note reviewed.   Constitutional:       General: He is not in acute distress.     Appearance: Normal appearance. He is well-developed and well-groomed. He is not ill-appearing, toxic-appearing or diaphoretic.      Comments: Calm, laughing, sitting on exam table   HENT:      Head: Normocephalic.      Right Ear: Tympanic membrane and external ear normal.      Left Ear: Tympanic membrane and external ear normal.      Nose: Nose normal.      Mouth/Throat:      Mouth: Mucous membranes are moist.      Pharynx: Oropharynx is clear. No oropharyngeal exudate or posterior oropharyngeal erythema.   Eyes:      Conjunctiva/sclera: Conjunctivae normal.   Cardiovascular:      Rate and Rhythm: Normal rate and regular rhythm.      Heart sounds: Normal heart sounds. Heart sounds not distant. No murmur heard.     No friction rub. No gallop.   Pulmonary:      Effort: Pulmonary effort is normal. No accessory muscle usage or respiratory distress.      Breath sounds: Normal breath sounds. No stridor. No decreased breath sounds, wheezing or rhonchi.   Abdominal:      General: Abdomen is flat. Bowel sounds are normal. There is no distension.      Palpations: Abdomen is soft.      Tenderness: There is no abdominal tenderness. There is no guarding.   Musculoskeletal:         General: Normal range of motion.      Cervical back: " Full passive range of motion without pain and normal range of motion.      Right lower leg: No edema.      Left lower leg: No edema.   Skin:     General: Skin is warm.      Capillary Refill: Capillary refill takes less than 2 seconds.      Findings: No rash.   Neurological:      Mental Status: He is alert.   Psychiatric:         Behavior: Behavior is cooperative.       Assessment:   1. Autism    2. Encounter for administration of vaccine      Plan:  Autism  - Overall doing well  - 90L form completed today  - Continue medication management per psych  - Routine lab work and Tdap today    Orders Placed This Encounter    CBC Auto Differential    Comprehensive Metabolic Panel    Folate    Hemoglobin A1C    Lipid Panel    TSH    Vitamin B12    polyethylene glycol (GLYCOLAX) 17 gram PwPk    Tdap vaccine injection 0.5 mL       Return to clinic in 6 months for virtual visit, or sooner if needed.       Andres Alexander MD  \[facility] [place] [facility]\"" Family Medicine -II

## 2025-03-05 NOTE — PROGRESS NOTES
Patient was discussed with the resident on the DOS.   I was immediately available during the encounter.   Services were provided at a teaching facility.   I have reviewed and agree with the resident's findings, including all diagnostic interpretations and plans as written.     Shakira Kang MD  Family Medicine  [facility] / [facility]

## 2025-03-28 DIAGNOSIS — F29 PSYCHOSIS, UNSPECIFIED PSYCHOSIS TYPE: ICD-10-CM

## 2025-03-28 DIAGNOSIS — F41.9 ANXIETY: ICD-10-CM

## 2025-03-28 DIAGNOSIS — F91.3 OPPOSITIONAL DEFIANT DISORDER: ICD-10-CM

## 2025-03-28 DIAGNOSIS — R44.3 HALLUCINATIONS: ICD-10-CM

## 2025-03-28 DIAGNOSIS — F84.0 AUTISM: ICD-10-CM

## 2025-03-28 RX ORDER — HALOPERIDOL 5 MG/1
TABLET ORAL
Qty: 90 TABLET | Refills: 1 | Status: SHIPPED | OUTPATIENT
Start: 2025-03-28

## 2025-03-28 RX ORDER — LORAZEPAM 0.5 MG/1
0.5 TABLET ORAL 2 TIMES DAILY PRN
Qty: 30 TABLET | Refills: 2 | Status: SHIPPED | OUTPATIENT
Start: 2025-03-28

## 2025-03-28 RX ORDER — OXCARBAZEPINE 300 MG/1
300 TABLET, FILM COATED ORAL 2 TIMES DAILY
Qty: 60 TABLET | Refills: 5 | Status: SHIPPED | OUTPATIENT
Start: 2025-03-28

## 2025-03-28 RX ORDER — DIVALPROEX SODIUM 500 MG/1
500 TABLET, FILM COATED, EXTENDED RELEASE ORAL 2 TIMES DAILY
Qty: 60 TABLET | Refills: 5 | Status: SHIPPED | OUTPATIENT
Start: 2025-03-28

## 2025-03-28 NOTE — TELEPHONE ENCOUNTER
----- Message from Polina sent at 3/28/2025 12:21 PM CDT -----  .Who Called: Rod Siddiqui Jr.Refill or New Rx:RefillRX Name and Strength:divalproex ER (DEPAKOTE ER) 500 MG Tb24 24 hr tabletHow is the patient currently taking it? (ex. 1XDay):2xIs this a 30 day or 90 day RX:30Local or Mail Order:ocalList of preferred pharmacies on file (remove unneeded): [unfilled]If different Pharmacy is requested, enter Pharmacy information here including location and phone number: [facility] Ordering Provider:constantinePreferdanica Method of Contact: Phone CallPatient's Preferred Phone Number on File: [phone] Best Call Back Number, if different:Additional Information: .Who Called: Rod Siddiqui Jr.Refill or New Rx:RefillRX Name and Strength:LORazepam (ATIVAN) 0.5 MG tabletHow is the patient currently taking it? (ex. 1XDay):2x as neededIs this a 30 day or 90 day RX:30Local or Mail Order:ocalList of preferred pharmacies on file (remove unneeded): [unfilled]If different Pharmacy is requested, enter Pharmacy information here including location and phone number: [facility] Ordering Provider:constantinePreferred Method of Contact: Phone CallPatient's Preferred Phone Number on File: [phone] Best Call Back Number, if different:Additional Information:

## 2025-03-28 NOTE — TELEPHONE ENCOUNTER
Please see attached refill request.    Next scheduled office visit: Visit date not found.    Last office visit: 2/24/2025.     Thank you!

## 2025-05-08 ENCOUNTER — OFFICE VISIT (OUTPATIENT)
Dept: FAMILY MEDICINE | Facility: CLINIC | Age: 25
End: 2025-05-08
Payer: MEDICAID

## 2025-05-08 VITALS
DIASTOLIC BLOOD PRESSURE: 75 MMHG | TEMPERATURE: 98 F | WEIGHT: 184.88 LBS | OXYGEN SATURATION: 99 % | SYSTOLIC BLOOD PRESSURE: 115 MMHG | HEART RATE: 70 BPM | HEIGHT: 63 IN | BODY MASS INDEX: 32.76 KG/M2

## 2025-05-08 DIAGNOSIS — R32 URINARY INCONTINENCE, UNSPECIFIED TYPE: Primary | ICD-10-CM

## 2025-05-08 PROCEDURE — 99213 OFFICE O/P EST LOW 20 MIN: CPT | Mod: PBBFAC

## 2025-05-08 NOTE — PROGRESS NOTES
"[facility] OFFICE VISIT NOTE  Rod Siddiqui Jr.  [id number]  05/08/2025    CC: urinary incontinence    Rod Siddiqui Jr. is a 24 y.o. male with PMH of autism (non-verbal) presenting to [facility] for     Started with 1 episode a few weeks ago, with no more occurrence after taking Azof for a few days.   Episode recurred last night while sleeping and mother found the bed wet this morning.   He has had. 2 more episodes today while awake.  Denies increased frequency or volume of urination  Denies fevers. Does not appear to be in pain per mother.   Denies constipation. Last BM today.   Denies similar issues in the past.   Denies hx of UTI    Hx of autism, ODD, psychosis, following with Dr. Prater.   Meds: within past 3 years, started Depakote, Haldol, Trileptal, trazodone. Denies vomiting, confusion, lethargy, seizure    Review of Systems: see HPI    Blood pressure 115/75, pulse 70, temperature 98.1 °F (36.7 °C), temperature source Oral, height 5' 2.99" (1.6 m), weight 83.9 kg (184 lb 14.4 oz), SpO2 99%.   Physical Exam  General: appears well, in no acute distress, non-verbal at baseline, smiling  Respiratory: clear to auscultation bilaterally, nonlabored respirations   Cardiovascular: regular rate and rhythm without murmurs or gallops  Gastrointestinal: soft, non-tender, non-distended, bowel sounds present   Genitourinary: no suprapubic tenderness noted      Current Medications:   Current Medications[1]    Assessment:   1. Urinary incontinence, unspecified type        Plan:  - work up as below  - patient was not able to provide urine in clinic today. Mother agrees to submit sample tomorrow, Bl 2 outpatient lab. Will follow up results. For now, Rx sent for pyridium as it may relieve symptoms if it is indeed UTI causing incontinence.     Orders Placed This Encounter    Comprehensive Metabolic Panel    Urinalysis, Reflex to Urine Culture Urine, Clean Catch     Keep appt 5/22/25 with PCP.     Sarah Gama  [facility] Resident      "     [1]   Current Outpatient Medications   Medication Sig Dispense Refill    divalproex ER (DEPAKOTE ER) 500 MG Tb24 24 hr tablet Take 1 tablet (500 mg total) by mouth 2 (two) times a day. 60 tablet 5    folic acid (FOLVITE) 1 MG tablet Take 1 tablet (1 mg total) by mouth once daily. 90 tablet 0    haloperidoL (HALDOL) 5 MG tablet Take 5mg (one tablet) by mouth daily and take 10mg (two tablets) by mouth nightly 90 tablet 1    LORazepam (ATIVAN) 0.5 MG tablet Take 1 tablet (0.5 mg total) by mouth 2 (two) times daily as needed (anxiety or panic attack). 30 tablet 2    OXcarbazepine (TRILEPTAL) 300 MG Tab Take 1 tablet (300 mg total) by mouth 2 (two) times daily. 60 tablet 5    polyethylene glycol (GLYCOLAX) 17 gram PwPk Take 17 g by mouth once daily. 90 each 3    traZODone (DESYREL) 100 MG tablet Take 100-200mg (1-2 tablets) by mouth nightly as need insomnia. 60 tablet 5     No current facility-administered medications for this visit.

## 2025-05-09 NOTE — PROGRESS NOTES
I reviewed History, PE, A/P and chart was reviewed.  Services provided in the outpatient department of a teaching facility, I was immediately available.  I agree with resident, care reasonable and appropriate with any exceptions stated below.    Libra Crystal MD  [facility] - [facility]  [facility]

## 2025-06-02 ENCOUNTER — OFFICE VISIT (OUTPATIENT)
Dept: FAMILY MEDICINE | Facility: CLINIC | Age: 25
End: 2025-06-02
Payer: MEDICAID

## 2025-06-02 VITALS
BODY MASS INDEX: 32.76 KG/M2 | HEART RATE: 74 BPM | TEMPERATURE: 98 F | OXYGEN SATURATION: 99 % | RESPIRATION RATE: 20 BRPM | SYSTOLIC BLOOD PRESSURE: 110 MMHG | DIASTOLIC BLOOD PRESSURE: 82 MMHG | HEIGHT: 63 IN

## 2025-06-02 DIAGNOSIS — E53.8 FOLIC ACID DEFICIENCY: ICD-10-CM

## 2025-06-02 DIAGNOSIS — F29 PSYCHOSIS, UNSPECIFIED PSYCHOSIS TYPE: Primary | ICD-10-CM

## 2025-06-02 DIAGNOSIS — R44.3 HALLUCINATIONS: ICD-10-CM

## 2025-06-02 RX ORDER — HALOPERIDOL 5 MG/1
10 TABLET ORAL 2 TIMES DAILY
Qty: 360 TABLET | Refills: 0 | Status: SHIPPED | OUTPATIENT
Start: 2025-06-02 | End: 2025-08-31

## 2025-06-02 RX ORDER — FOLIC ACID 1 MG/1
1 TABLET ORAL DAILY
Qty: 90 TABLET | Refills: 0 | Status: SHIPPED | OUTPATIENT
Start: 2025-06-02

## 2025-06-18 ENCOUNTER — TELEPHONE (OUTPATIENT)
Dept: FAMILY MEDICINE | Facility: CLINIC | Age: 25
End: 2025-06-18
Payer: MEDICAID

## 2025-06-18 NOTE — TELEPHONE ENCOUNTER
----- Message from Lupe sent at 6/18/2025  2:07 PM CDT -----  Pt called requesting a refill on the following medication(s): LORazepam (ATIVAN) 0.5 MG tablet     Pharmacy Preferred is: [facility] - [place] [address] [address]     Mom is requesting pt's meds to be filled until pt gets into new pt appt with psych dr. Mom states that pt had an appt in Aug and when calling them to request a refill, she was just told that they had to cancel appt with Mckenzie Crowe because she was behavioral health and not psych... states that they told her to call her PCP for refills until pt is seen by psych dr. Charles

## 2025-06-23 ENCOUNTER — PATIENT MESSAGE (OUTPATIENT)
Dept: FAMILY MEDICINE | Facility: CLINIC | Age: 25
End: 2025-06-23
Payer: MEDICAID

## 2025-06-23 ENCOUNTER — TELEPHONE (OUTPATIENT)
Dept: FAMILY MEDICINE | Facility: CLINIC | Age: 25
End: 2025-06-23
Payer: MEDICAID

## 2025-06-23 DIAGNOSIS — F51.04 PSYCHOPHYSIOLOGICAL INSOMNIA: ICD-10-CM

## 2025-06-23 DIAGNOSIS — F41.9 ANXIETY: ICD-10-CM

## 2025-06-23 DIAGNOSIS — F29 PSYCHOSIS, UNSPECIFIED PSYCHOSIS TYPE: ICD-10-CM

## 2025-06-23 DIAGNOSIS — F84.0 AUTISM: ICD-10-CM

## 2025-06-23 RX ORDER — LORAZEPAM 0.5 MG/1
0.5 TABLET ORAL 2 TIMES DAILY PRN
Qty: 30 TABLET | Refills: 2 | Status: SHIPPED | OUTPATIENT
Start: 2025-06-23

## 2025-06-23 RX ORDER — TRAZODONE HYDROCHLORIDE 100 MG/1
TABLET ORAL
Qty: 60 TABLET | Refills: 5 | Status: SHIPPED | OUTPATIENT
Start: 2025-06-23

## 2025-06-23 RX ORDER — OXCARBAZEPINE 300 MG/1
300 TABLET, FILM COATED ORAL 2 TIMES DAILY
Qty: 60 TABLET | Refills: 5 | Status: SHIPPED | OUTPATIENT
Start: 2025-06-23

## 2025-06-30 ENCOUNTER — PATIENT MESSAGE (OUTPATIENT)
Dept: FAMILY MEDICINE | Facility: CLINIC | Age: 25
End: 2025-06-30
Payer: MEDICAID

## 2025-07-01 DIAGNOSIS — F84.0 AUTISM: Primary | ICD-10-CM

## 2025-07-01 DIAGNOSIS — F91.3 OPPOSITIONAL DEFIANT DISORDER: ICD-10-CM

## 2025-07-03 DIAGNOSIS — F84.0 AUTISM: ICD-10-CM

## 2025-07-03 DIAGNOSIS — F91.3 OPPOSITIONAL DEFIANT DISORDER: Primary | ICD-10-CM

## 2025-08-13 DIAGNOSIS — F29 PSYCHOSIS, UNSPECIFIED PSYCHOSIS TYPE: ICD-10-CM

## 2025-08-13 DIAGNOSIS — R44.3 HALLUCINATIONS: ICD-10-CM

## 2025-08-13 RX ORDER — HALOPERIDOL 5 MG/1
10 TABLET ORAL 2 TIMES DAILY
Qty: 360 TABLET | Refills: 0 | Status: SHIPPED | OUTPATIENT
Start: 2025-08-13 | End: 2025-11-11

## 2025-08-14 ENCOUNTER — TELEPHONE (OUTPATIENT)
Dept: FAMILY MEDICINE | Facility: CLINIC | Age: 25
End: 2025-08-14
Payer: MEDICAID

## 2025-09-05 DIAGNOSIS — E53.8 FOLIC ACID DEFICIENCY: ICD-10-CM

## 2025-09-05 RX ORDER — FOLIC ACID 1 MG/1
1 TABLET ORAL DAILY
Qty: 90 TABLET | Refills: 0 | Status: SHIPPED | OUTPATIENT
Start: 2025-09-05